# Patient Record
Sex: FEMALE | Race: WHITE | Employment: FULL TIME
[De-identification: names, ages, dates, MRNs, and addresses within clinical notes are randomized per-mention and may not be internally consistent; named-entity substitution may affect disease eponyms.]

---

## 2017-01-10 ENCOUNTER — TELEPHONE (OUTPATIENT)
Dept: OBGYN | Facility: CLINIC | Age: 27
End: 2017-01-10

## 2017-02-07 DIAGNOSIS — T83.32XA IUD STRINGS LOST: ICD-10-CM

## 2018-08-27 ENCOUNTER — OFFICE VISIT (OUTPATIENT)
Dept: OBGYN CLINIC | Age: 28
End: 2018-08-27
Payer: COMMERCIAL

## 2018-08-27 ENCOUNTER — HOSPITAL ENCOUNTER (OUTPATIENT)
Age: 28
Setting detail: SPECIMEN
Discharge: HOME | End: 2018-08-27
Payer: COMMERCIAL

## 2018-08-27 VITALS
BODY MASS INDEX: 24.16 KG/M2 | SYSTOLIC BLOOD PRESSURE: 100 MMHG | WEIGHT: 145 LBS | HEIGHT: 65 IN | HEART RATE: 74 BPM | DIASTOLIC BLOOD PRESSURE: 70 MMHG

## 2018-08-27 DIAGNOSIS — Z01.419 PAP SMEAR, AS PART OF ROUTINE GYNECOLOGICAL EXAMINATION: Primary | ICD-10-CM

## 2018-08-27 DIAGNOSIS — N76.1 CHRONIC VAGINITIS: ICD-10-CM

## 2018-08-27 DIAGNOSIS — R10.2 PELVIC PAIN: ICD-10-CM

## 2018-08-27 DIAGNOSIS — Z01.419 PAP SMEAR, AS PART OF ROUTINE GYNECOLOGICAL EXAMINATION: ICD-10-CM

## 2018-08-27 LAB
BILIRUBIN URINE: NEGATIVE
COLOR: YELLOW
COMMENT UA: NORMAL
DIRECT EXAM: ABNORMAL
GLUCOSE URINE: NEGATIVE
KETONES, URINE: NEGATIVE
LEUKOCYTE ESTERASE, URINE: NEGATIVE
Lab: ABNORMAL
NITRITE, URINE: NEGATIVE
PH UA: 7 (ref 5–8)
PROTEIN UA: NEGATIVE
SPECIFIC GRAVITY UA: 1 (ref 1–1.03)
SPECIMEN DESCRIPTION: ABNORMAL
STATUS: ABNORMAL
TURBIDITY: CLEAR
URINE HGB: NEGATIVE
UROBILINOGEN, URINE: NORMAL

## 2018-08-27 PROCEDURE — 87480 CANDIDA DNA DIR PROBE: CPT

## 2018-08-27 PROCEDURE — G0145 SCR C/V CYTO,THINLAYER,RESCR: HCPCS

## 2018-08-27 PROCEDURE — 81003 URINALYSIS AUTO W/O SCOPE: CPT

## 2018-08-27 PROCEDURE — 87491 CHLMYD TRACH DNA AMP PROBE: CPT

## 2018-08-27 PROCEDURE — 99395 PREV VISIT EST AGE 18-39: CPT | Performed by: NURSE PRACTITIONER

## 2018-08-27 PROCEDURE — 87624 HPV HI-RISK TYP POOLED RSLT: CPT

## 2018-08-27 PROCEDURE — 87510 GARDNER VAG DNA DIR PROBE: CPT

## 2018-08-27 PROCEDURE — 87660 TRICHOMONAS VAGIN DIR PROBE: CPT

## 2018-08-27 PROCEDURE — 87591 N.GONORRHOEAE DNA AMP PROB: CPT

## 2018-08-27 ASSESSMENT — PATIENT HEALTH QUESTIONNAIRE - PHQ9
SUM OF ALL RESPONSES TO PHQ QUESTIONS 1-9: 0
1. LITTLE INTEREST OR PLEASURE IN DOING THINGS: 0
2. FEELING DOWN, DEPRESSED OR HOPELESS: 0
SUM OF ALL RESPONSES TO PHQ9 QUESTIONS 1 & 2: 0
SUM OF ALL RESPONSES TO PHQ QUESTIONS 1-9: 0

## 2018-08-27 NOTE — PROGRESS NOTES
History and Physical  [address], [address] [facility] [address], [address] [facility] [phone]   Fax [phone]  Mk Parrish  2018              33 y.o. Chief Complaint   Patient presents with    Annual Exam       Patient's last menstrual period was 2018 (exact date). Primary Care Physician: No primary care provider on file. The patient was seen and examined. She is here for her annual exam. Currently has IUD in place- 2 years ago (2016). Complaining of pelvic pain started 2-3 weeks ago. Complaining of sharp stabbing pain for the first couple of weeks. Pain is not severe now like it was a couple of weeks ago, but is a generalized ache and improving. Currently sexually active with partner of 8 years. Complaining of vaginal discharge on and off for the past couple of months. Denies odor. Her bowels are regular. There are no voiding complaints. She denies any bloating. She denies vaginal discharge and was counseled on STD's and the need for barrier contraception. HPI : Mk Parrish is a 33 y.o. female     Annual exam  Pelvic pain  vaginitis  ________________________________________________________________________  Obstetric History       T1      L1     SAB0   TAB0   Ectopic0   Molar0   Multiple0   Live Births1       # Outcome Date GA Lbr Sal/2nd Weight Sex Delivery Anes PTL Lv   1 Term 11 40w0d  7 lb 4 oz (3.289 kg) M Vag-Spont   MARKELL      Name: Geradine Jesse:  9                Apgar5: 9        Past Medical History:   Diagnosis Date    Asthma     Attention deficit disorder (ADD)     Depression     LSIL (low grade squamous intraepithelial lesion) on Pap smear 12    LSIL (low grade squamous intraepithelial lesion) on Pap smear                                                                    History reviewed. No pertinent surgical history.   Family History   Problem Relation Age of Onset    Diabetes Father     Cirrhosis Father     Heart Disease Mother         tachycardia    Depression Mother     Anemia Mother     Other Mother         bulemia     Social History     Social History    Marital status: Single     Spouse name: N/A    Number of children: N/A    Years of education: N/A     Occupational History    Not on file. Social History Main Topics    Smoking status: Never Smoker    Smokeless tobacco: Never Used    Alcohol use No    Drug use: No    Sexual activity: Yes     Partners: Male     Other Topics Concern    Not on file     Social History Narrative    No narrative on file       MEDICATIONS:  Current Outpatient Prescriptions   Medication Sig Dispense Refill    levonorgestrel (MIRENA) 20 MCG/24HR IUD 1 each by Intrauterine route once. No current facility-administered medications for this visit. ALLERGIES:  Allergies as of 08/27/2018 - Review Complete 08/27/2018   Allergen Reaction Noted    Neosporin [bacitracin-neomycin-polymyxin] Rash 08/22/2011       Symptoms of decreased mood absent  Symptoms of anhedonia absent    **If either question is answered in a  positive fashion then complete the PHQ9 Scoring Evaluation and make the appropriate referral**      Immunization status: stated as current, but no records available. Gynecologic History:  Menarche: 16 yo  Menopause at [address] [facility] yo     Patient's last menstrual period was 08/20/2018 (exact date). Sexually Active: Yes    STD History: yes, HPV on pap smear    Permanent Sterilization: No   Reversible Birth Control: Yes Mirena in place 12/2016    Hormone Replacement Exposure: No      Genetic Qualified Family History of Breast, Ovarian , Colon or Uterine Cancer: Yes (patient's maternal grandmother with breast cancer- unsure of what age). Thinks her sister had ovarian cancer but unsure. Discussed genetic testing.  Will check with her sister to see if she had ovarian cancer and if she had genetic testing completed. If YES see scanned worksheet. Preventative Health Testing:    Health Maintenance:  Health Maintenance Due   Topic Date Due    HIV screen  06/07/2006    DTaP/Tdap/Td vaccine (1 - Tdap) 06/07/2010    Pneumococcal med risk (1 of 1 - PPSV23) 06/07/2010    Cervical cancer screen  10/27/2017       Date of Last Pap Smear: 10/2016 neg  Abnormal Pap Smear History: yes  Colposcopy History:   Date of Last Mammogram: NA  Date of Last Colonoscopy:   Date of Last Bone Density:      ________________________________________________________________________        REVIEW OF SYSTEMS:    yes   A minimum of an eleven point review of systems was completed. Review Of Systems (11 point):  Constitutional: No fever, chills or malaise; No weight change or fatigue  Head and Eyes: No vision, Headache, Dizziness or trauma in last 12 months  ENT ROS: No hearing, Tinnitis, sinus or taste problems  Hematological and Lymphatic ROS:No Lymphoma, Von Willebrand's, Hemophillia or Bleeding History  Psych ROS: No Depression, Homicidal thoughts,suicidal thoughts, or anxiety  Breast ROS: No prior breast abnormalities or lumps  Respiratory ROS: No SOB, Pneumoniae,Cough, or Pulmonary Embolism History  Cardiovascular ROS: No Chest Pain with Exertion, Palpitations, Syncope, Edema, Arrhythmia  Gastrointestinal ROS: No Indigestion, Heartburn, Nausea, vomiting, Diarrhea, Constipation,or Bowel Changes; No Bloody Stools or melena  Genito-Urinary ROS: No Dysuria, Hematuria or Nocturia.  No Urinary Incontinence +Vaginal Discharge, pelvic pain  Musculoskeletal ROS: No Arthralgia, Arthritis,Gout,Osteoporosis or Rheumatism  Neurological ROS: No CVA, Migraines, Epilepsy, Seizure Hx, or Limb Weakness  Dermatological ROS: No Rash, Itching, Hives, Mole Changes or Cancer PHYSICAL Exam:     Constitutional:  Vitals:    08/27/18 0827   BP: 100/70   Site: Right Arm   Position: Sitting   Cuff Size: Medium Adult   Pulse: 74   Weight: 145 lb (65.8 kg)   Height: 5' 5\" (1.651 m)         General Appearance: This  is a well Developed, well Nourished, well groomed female. Her BMI was reviewed. Nutritional decision making was discussed. Skin:  There was a Normal Inspection of the skin without rashes or lesions. There were no rashes. (Papular, Maculopapular, Hives, Pustular, Macular)     There were no lesions (Ulcers, Erythema, Abn. Appearing Nevi)            Lymphatic:  No Lymph Nodes were Palpable in the neck , axilla or groin.  0 # Of Lymph Nodes; Location ; Character [Normal]  [Shotty] [Tender] [Enlarged]     Neck and EENT:  The neck was supple. There were no masses   The thyroid was not enlarged and had no masses. Perrla, EOMI B/L, TMI B/L No Abnormalities. Throat inspected-No exudates or Masses, Nares Patent No Masses        Respiratory: The lungs were auscultated and found to be clear. There were no rales, rhonchi or wheezes. There was a good respiratory effort. Cardiovascular: The heart was in a regular rate and rhythm. . No S3 or S4. There was no murmur appreciated. Location, grade, and radiation are not applicable. Extremities: The patients extremities were without calf tenderness, edema, or varicosities. There was full range of motion in all four extremities. Pulses in all four extremities were appreciated and are 2/4. Abdomen: The abdomen was soft and non-tender. There were good bowel sounds in all quadrants and there was no guarding, rebound or rigidity. On evaluation there was no evidence of hepatosplenomegaly and there was no costal vertebral dean tenderness bilaterally. No hernias were appreciated. Abdominal Scars: intact    Psych:   The patient had a normal Orientation to: Time, Place, Person, and Situation  There is no Mood / Affect changes    Breast:  (Chest)  normal appearance, no masses or tenderness  Self breast exams were reviewed in detail. Literature was given. Pelvic Exam:  Vulva and vagina appear normal. Bimanual exam reveals normal uterus and adnexa. IUD strings visualized at cervix. Rectal Exam:  exam declined by patient          Musculosk:  Normal Gait and station was noted. Digits were evaluated without abnormal findings. Range of motion, stability and strength were evaluated and found to be appropriate for the patients age. ASSESSMENT:      33 y.o. Annual   Diagnosis Orders   1. Pap smear, as part of routine gynecological examination  PAP SMEAR   2. Pelvic pain  Urinalysis Reflex to Culture    VAGINITIS DNA PROBE    C.trachomatis N.gonorrhoeae DNA    US Pelvis Complete    US Non OB Transvaginal   3. Chronic vaginitis  VAGINITIS DNA PROBE    C.trachomatis N.gonorrhoeae DNA          Chief Complaint   Patient presents with    Annual Exam          Past Medical History:   Diagnosis Date    Asthma     Attention deficit disorder (ADD)     Depression     LSIL (low grade squamous intraepithelial lesion) on Pap smear 9/12/12    LSIL (low grade squamous intraepithelial lesion) on Pap smear          Patient Active Problem List    Diagnosis Date Noted    LSIL (low grade squamous intraepithelial lesion) on Pap smear     Asthma     Depression     Attention deficit disorder (ADD)           Hereditary Breast, Ovarian, Colon and Uterine Cancer screening Done. Tobacco & Secondary smoke risks reviewed; instructed on cessation and avoidance      Counseling Completed:  Preventative Health Recommendations and Follow up. The patient was informed of the recommended preventative health recommendations. 1. Annuals every year; Cytology collections per prevailing guidelines. 2. Mammograms begin every year at 35 yo if no abnormalities are found and no family     History.   3. Bone density studies every 2-3 years. Begin at 73 yo. If no fracture history or osteoporosis family history. (significant). 4. Colonoscopy begin at 40 yo. Repeat every ten years if negative and no family history. 5. Calcium of 4488-4313 mg/day in split dosing  6. Vitamin D 400-800 IU/day  7. All other preventative health recommendations will be managed by the patients Primary care physician. PLAN:  Return in about 1 year (around 8/27/2019) for annual.   Pap smear and vaginal cultures collected. Pelvic ultrasound ordered. If pelvic pain continues, instructed to follow up with physician. UA C&S obtained in office. Repeat Annual every 1 year  Cervical Cytology Evaluation begins at 24years old. If Negative Cytology, Follow-up screening per current guidelines. Mammograms every 1 year. If 37 yo and last mammogram was negative. Calcium and Vitamin D dosing reviewed. Colonoscopy screening reviewed as well as onset for bone density testing. Birth control and barrier recommendations discussed. STD counseling and prevention reviewed. Gardisil counseling completed for all patients 7-35 yo. Routine health maintenance per patients PCP. Orders Placed This Encounter   Procedures    VAGINITIS DNA PROBE     Standing Status:   Future     Standing Expiration Date:   8/27/2019    C.trachomatis N.gonorrhoeae DNA     Standing Status:   Future     Standing Expiration Date:   8/27/2019    US Pelvis Complete     Standing Status:   Future     Standing Expiration Date:   11/27/2018     Order Specific Question:   Reason for exam:     Answer:   pelvic pain    US Non OB Transvaginal     Standing Status:   Future     Standing Expiration Date:   2/27/2019     Order Specific Question:   Reason for exam:     Answer:   pelvic pain    PAP SMEAR     Patient History:    No LMP recorded. OBGYN Status: Having periods  No past surgical history on file.   Medications/Contraceptives Affecting Cytology     Progestin Contraceptives - IUD Disp Start End    levonorgestrel (MIRENA) 20 MCG/24HR IUD       Class: Historical Med       Smoking status: Never Smoker                                                              Smokeless tobacco: Never Used                           Standing Status:   Future     Standing Expiration Date:   8/27/2019     Order Specific Question:   Collection Type     Answer: Thin Prep     Order Specific Question:   Prior Abnormal Pap Test     Answer:   No     Order Specific Question:   Screening or Diagnostic     Answer:   Screening     Order Specific Question:   HPV Requested? Answer:   Yes -  If ASCUS Reflex HPV     Comments:   Reflex to ASCUS, LSIL     Order Specific Question:   High Risk Patient     Answer:   N/A    Urinalysis Reflex to Culture     Standing Status:   Future     Standing Expiration Date:   8/27/2019     Order Specific Question:   SPECIFY(EX-CATH,MIDSTREAM,CYSTO,ETC)?      Answer:   midstream

## 2018-08-28 ENCOUNTER — TELEPHONE (OUTPATIENT)
Dept: OBGYN CLINIC | Age: 28
End: 2018-08-28

## 2018-08-28 LAB
C TRACH DNA GENITAL QL NAA+PROBE: NEGATIVE
N. GONORRHOEAE DNA: NEGATIVE

## 2018-08-28 RX ORDER — METRONIDAZOLE 500 MG/1
500 TABLET ORAL 2 TIMES DAILY
Qty: 14 TABLET | Refills: 0 | Status: SHIPPED | OUTPATIENT
Start: 2018-08-28 | End: 2019-11-13 | Stop reason: SDUPTHER

## 2018-08-28 NOTE — TELEPHONE ENCOUNTER
----- Message from SHONDA Castillo - CNP sent at 8/27/2018  5:38 PM EDT -----  +BV  Flagyl 500mg PO BID X 7 days  UA WNL

## 2018-08-28 NOTE — TELEPHONE ENCOUNTER
Per David Collado pt notified of result of culture.   Rx sent to [facility]

## 2018-09-18 LAB
HPV SAMPLE: ABNORMAL
HPV SOURCE: ABNORMAL
HPV, GENOTYPE 16: DETECTED
HPV, GENOTYPE 18: NOT DETECTED
HPV, HIGH RISK OTHER: NOT DETECTED
HPV, INTERPRETATION: ABNORMAL

## 2018-09-19 ENCOUNTER — TELEPHONE (OUTPATIENT)
Dept: OBGYN CLINIC | Age: 28
End: 2018-09-19

## 2018-09-19 LAB — CYTOLOGY REPORT: NORMAL

## 2018-09-19 NOTE — TELEPHONE ENCOUNTER
Per Lawrence Driscoll patient notified of need for colposcopy and will be scheduled per Cristy Oquendo

## 2019-01-11 ENCOUNTER — HOSPITAL ENCOUNTER (OUTPATIENT)
Age: 29
Setting detail: SPECIMEN
Discharge: HOME | End: 2019-01-11
Payer: COMMERCIAL

## 2019-01-11 ENCOUNTER — PROCEDURE VISIT (OUTPATIENT)
Dept: OBGYN CLINIC | Age: 29
End: 2019-01-11
Payer: COMMERCIAL

## 2019-01-11 VITALS
BODY MASS INDEX: 24.66 KG/M2 | HEART RATE: 68 BPM | HEIGHT: 65 IN | DIASTOLIC BLOOD PRESSURE: 80 MMHG | SYSTOLIC BLOOD PRESSURE: 120 MMHG | WEIGHT: 148 LBS

## 2019-01-11 DIAGNOSIS — R87.610 ASCUS WITH POSITIVE HIGH RISK HPV CERVICAL: Primary | ICD-10-CM

## 2019-01-11 DIAGNOSIS — R87.810 ASCUS WITH POSITIVE HIGH RISK HPV CERVICAL: Primary | ICD-10-CM

## 2019-01-11 DIAGNOSIS — R87.612 LGSIL OF CERVIX OF UNDETERMINED SIGNIFICANCE: ICD-10-CM

## 2019-01-11 DIAGNOSIS — Z32.02 NEGATIVE PREGNANCY TEST: ICD-10-CM

## 2019-01-11 LAB
CONTROL: NORMAL
PREGNANCY TEST URINE, POC: NEGATIVE

## 2019-01-11 PROCEDURE — 57456 ENDOCERV CURETTAGE W/SCOPE: CPT | Performed by: OBSTETRICS & GYNECOLOGY

## 2019-01-11 PROCEDURE — 88305 TISSUE EXAM BY PATHOLOGIST: CPT

## 2019-01-11 PROCEDURE — 81025 URINE PREGNANCY TEST: CPT | Performed by: OBSTETRICS & GYNECOLOGY

## 2019-01-15 LAB — SURGICAL PATHOLOGY REPORT: NORMAL

## 2019-11-08 ENCOUNTER — OFFICE VISIT (OUTPATIENT)
Dept: DERMATOLOGY | Age: 29
End: 2019-11-08
Payer: COMMERCIAL

## 2019-11-08 VITALS
WEIGHT: 151.4 LBS | BODY MASS INDEX: 25.22 KG/M2 | DIASTOLIC BLOOD PRESSURE: 65 MMHG | SYSTOLIC BLOOD PRESSURE: 101 MMHG | HEART RATE: 74 BPM | OXYGEN SATURATION: 98 % | HEIGHT: 65 IN

## 2019-11-08 DIAGNOSIS — Z79.899 ON ISOTRETINOIN THERAPY: Primary | ICD-10-CM

## 2019-11-08 DIAGNOSIS — L70.0 ACNE VULGARIS: ICD-10-CM

## 2019-11-08 PROCEDURE — 99202 OFFICE O/P NEW SF 15 MIN: CPT | Performed by: DERMATOLOGY

## 2019-11-08 RX ORDER — SPIRONOLACTONE 50 MG/1
TABLET, FILM COATED ORAL
Qty: 30 TABLET | Refills: 2 | Status: SHIPPED | OUTPATIENT
Start: 2019-11-08 | End: 2020-01-10

## 2019-11-13 ENCOUNTER — TELEPHONE (OUTPATIENT)
Dept: OBGYN CLINIC | Age: 29
End: 2019-11-13

## 2019-11-13 RX ORDER — METRONIDAZOLE 500 MG/1
500 TABLET ORAL 2 TIMES DAILY
Qty: 14 TABLET | Refills: 0 | Status: SHIPPED | OUTPATIENT
Start: 2019-11-13 | End: 2019-11-20

## 2019-12-02 ENCOUNTER — HOSPITAL ENCOUNTER (OUTPATIENT)
Age: 29
Setting detail: SPECIMEN
Discharge: HOME | End: 2019-12-02
Payer: COMMERCIAL

## 2019-12-02 DIAGNOSIS — Z79.899 ON ISOTRETINOIN THERAPY: ICD-10-CM

## 2019-12-02 LAB — HCG(URINE) PREGNANCY TEST: NEGATIVE

## 2019-12-03 ENCOUNTER — TELEPHONE (OUTPATIENT)
Dept: DERMATOLOGY | Age: 29
End: 2019-12-03

## 2019-12-03 DIAGNOSIS — L70.0 ACNE VULGARIS: Primary | ICD-10-CM

## 2019-12-04 RX ORDER — ISOTRETINOIN 30 MG/1
30 CAPSULE ORAL DAILY
Qty: 30 CAPSULE | Refills: 0 | Status: SHIPPED | OUTPATIENT
Start: 2019-12-04 | End: 2020-01-03

## 2020-01-09 ENCOUNTER — TELEPHONE (OUTPATIENT)
Dept: DERMATOLOGY | Age: 30
End: 2020-01-09

## 2020-01-09 NOTE — TELEPHONE ENCOUNTER
Pt informed that no pa needed, per epic from [employer]. Told her to touch base with [employer] for further info-like copay and such

## 2020-01-10 ENCOUNTER — OFFICE VISIT (OUTPATIENT)
Dept: DERMATOLOGY | Age: 30
End: 2020-01-10
Payer: COMMERCIAL

## 2020-01-10 VITALS
SYSTOLIC BLOOD PRESSURE: 113 MMHG | HEIGHT: 64 IN | BODY MASS INDEX: 26.12 KG/M2 | OXYGEN SATURATION: 99 % | HEART RATE: 63 BPM | DIASTOLIC BLOOD PRESSURE: 68 MMHG | WEIGHT: 153 LBS

## 2020-01-10 PROCEDURE — 99213 OFFICE O/P EST LOW 20 MIN: CPT | Performed by: DERMATOLOGY

## 2020-01-10 NOTE — PROGRESS NOTES
Dermatology Patient Note  [facility]  [facility]  [address] [address]  Dept: [phone]  Dept Fax: [phone]      VISITDATE: 1/10/2020   REFERRING PROVIDER: No ref. provider found      Ronit Zhao is a 30 y.o. female  who presents today in the office for:    Follow-up (4 week accutane-start )      HISTORY OF PRESENT ILLNESS:  Patient presents for f/u acne  Interval history: stopped taking spironolactone because she did not see results right away, ready to start accutane. Acne is still severe with papules, nodules, cysts, scarring      CURRENT MEDICATIONS:   Current Outpatient Medications   Medication Sig Dispense Refill    levonorgestrel (MIRENA) 20 MCG/24HR IUD 1 each by Intrauterine route once. No current facility-administered medications for this visit. ALLERGIES:   Allergies   Allergen Reactions    Neosporin [Bacitracin-Neomycin-Polymyxin] Rash       SOCIAL HISTORY:  Social History     Tobacco Use    Smoking status: Never Smoker    Smokeless tobacco: Never Used   Substance Use Topics    Alcohol use: No       REVIEW OF SYSTEMS:  Review of Systems  Skin: Denies any new changing, growing orbleeding lesions or rashes except as described in the HPI   Constitutional: Denies fevers, chills, and malaise. PHYSICAL EXAM:   /68 (Site: Right Upper Arm, Position: Sitting, Cuff Size: Medium Adult)   Pulse 63   Ht 5' 4\" (1.626 m)   Wt 153 lb (69.4 kg)   SpO2 99%   BMI 26.26 kg/m²     General Exam:  General Appearance: No acute distress, Well nourished     Neuro: Alert and oriented to person, place and time  Psych: Normal affect   Lymph Node: Not performed    Cutaneous Exam: Performed as documented in clinic note below.   Acne exam, which includes the head/face, neck, chest, and back was performed    Pertinent Physical Exam Findings:  Physical Exam  Acne scarring, papules, nodules, cysts of face    Photo surveillance performed: or headaches not relieved by OTC pain relievers. Pt is to inform all other medical providers that pt is on isotretinoin. RTC 1 month            Patient Instructions   -Complete Lab work  -Follow up in 4 weeks  -Discontinue all other acne medications        Follow-up: No follow-ups on file. This note was created with the assistance of a speech-recognition program.  Although the intention is to generate a document that actually reflects the content of the visit, no guarantees can be provided that every mistake has been identified and corrected byediting.     Electronically signed by Meche Gan MD on 1/10/20 at 12:01 PM

## 2020-01-13 ENCOUNTER — HOSPITAL ENCOUNTER (OUTPATIENT)
Age: 30
Setting detail: SPECIMEN
Discharge: HOME | End: 2020-01-13
Payer: COMMERCIAL

## 2020-01-13 LAB
ALBUMIN SERPL-MCNC: 4.5 G/DL (ref 3.5–5.2)
ALBUMIN/GLOBULIN RATIO: 1.6 (ref 1–2.5)
ALP BLD-CCNC: 51 U/L (ref 35–104)
ALT SERPL-CCNC: 18 U/L (ref 5–33)
AST SERPL-CCNC: 21 U/L
BILIRUB SERPL-MCNC: 0.41 MG/DL (ref 0.3–1.2)
BILIRUBIN DIRECT: 0.1 MG/DL
BILIRUBIN, INDIRECT: 0.31 MG/DL (ref 0–1)
CHOLESTEROL/HDL RATIO: 2.3
CHOLESTEROL: 147 MG/DL
GLOBULIN: NORMAL G/DL (ref 1.5–3.8)
HCG(URINE) PREGNANCY TEST: NEGATIVE
HDLC SERPL-MCNC: 63 MG/DL
LDL CHOLESTEROL: 73 MG/DL (ref 0–130)
TOTAL PROTEIN: 7.4 G/DL (ref 6.4–8.3)
TRIGL SERPL-MCNC: 54 MG/DL
VLDLC SERPL CALC-MCNC: NORMAL MG/DL (ref 1–30)

## 2020-01-14 ENCOUNTER — TELEPHONE (OUTPATIENT)
Dept: DERMATOLOGY | Age: 30
End: 2020-01-14

## 2020-01-14 RX ORDER — ISOTRETINOIN 30 MG/1
30 CAPSULE ORAL DAILY
Qty: 30 CAPSULE | Refills: 0 | Status: SHIPPED | OUTPATIENT
Start: 2020-01-14 | End: 2020-02-13

## 2020-01-14 NOTE — TELEPHONE ENCOUNTER
Confirm Patient Counseling is Complete. The patient's Program Status is Required to Demonstrate Comprehension First Time. The patient must answer her Comprehension questions, and then may have her prescription filled before 11:59 PM Eastern Time on 01/19/2020.      Pt informed of all and understands

## 2020-01-14 NOTE — TELEPHONE ENCOUNTER
Please inform patient that labs were normal and preg test negative. Please confirm in ipledge and ask patient to complete comprehension questions. Rx sent to pharmacy.

## 2020-02-11 ENCOUNTER — OFFICE VISIT (OUTPATIENT)
Dept: DERMATOLOGY | Age: 30
End: 2020-02-11
Payer: COMMERCIAL

## 2020-02-11 VITALS
HEIGHT: 65 IN | WEIGHT: 149 LBS | SYSTOLIC BLOOD PRESSURE: 110 MMHG | OXYGEN SATURATION: 98 % | HEART RATE: 74 BPM | DIASTOLIC BLOOD PRESSURE: 69 MMHG | BODY MASS INDEX: 24.83 KG/M2

## 2020-02-11 PROCEDURE — 99213 OFFICE O/P EST LOW 20 MIN: CPT | Performed by: DERMATOLOGY

## 2020-02-11 NOTE — PATIENT INSTRUCTIONS
1. Obtain labs today preferably at a [facility] lab  2. Once we receive the normal test we will order your medication and inform you  3. Once you have been informed, log into [email or web address] and answer the questions your username is your iPledge number and your password should be emailed to you (if you do not have or lose your password call [phone] to obtain it  4. After answering questions go to pharmacy and  medication  5. You have 5 days from your pregnancy test to answer questions and  isotretinoin or you will be locked out of the system and need another pregnancy test  6. Follow up in 4 weeks  7.  Apply triamcinolone twice daily to active rash/itch on hands

## 2020-02-11 NOTE — PROGRESS NOTES
Dermatology Patient Note  [facility]YSICI[facility]  [facility]  [address] [address]  Dept: [phone]  Dept Fax: [phone]      VISITDATE: 2/11/2020   REFERRING PROVIDER: No ref. provider found      Jorge Luis Tierney is a 30 y.o. female  who presents today in the office for:    Acne (Accutane f/u no side effects other than dryness and she has a rash on her hands)      HISTORY OF PRESENT ILLNESS:  HPI Accutane Female Followup:    Valerie Resendiz is on Isotretinoin seen today for her monthly follow-up evaluation. Current Isotretinoin Dose: 30 mg daily   Months of Completed Treatment: 1  Interim Course: no new acne bumps  Areas of Involvement: face  Symptoms: none    Primary Form of Contraception: IUD  Secondary Form of Contraception:Condoms  Patient confirms that she has used both forms of contraception simultaneously for the past 30 days. Yes    Side Effects from Isotretinoin: very dry, a little achey. Dry eczema rash of dorsal hands  Concerns for Mood Changes, Depression, Suicidal Thoughts: none    Laboratory Evaluation Review: Pregnancy Test Results Not Available for Review at the Time of Visit        CURRENT MEDICATIONS:   Current Outpatient Medications   Medication Sig Dispense Refill    triamcinolone (KENALOG) 0.1 % ointment Apply to rash twice daily (not face, armpit or groin) 80 g 1    ISOtretinoin (ACCUTANE) 30 MG chemo capsule Take 1 capsule by mouth daily 30 capsule 0    levonorgestrel (MIRENA) 20 MCG/24HR IUD 1 each by Intrauterine route once. No current facility-administered medications for this visit.         ALLERGIES:   Allergies   Allergen Reactions    Neosporin [Bacitracin-Neomycin-Polymyxin] Rash       SOCIAL HISTORY:  Social History     Tobacco Use    Smoking status: Never Smoker    Smokeless tobacco: Never Used   Substance Use Topics    Alcohol use: No       REVIEW OF SYSTEMS:  Review of Systems  Skin: Denies any new changing, growing orbleeding lesions or rashes except as described in the HPI   Constitutional: Denies fevers, chills, and malaise. PHYSICAL EXAM:   /69 (Site: Left Upper Arm, Position: Sitting, Cuff Size: Medium Adult)   Pulse 74   Ht 5' 5\" (1.651 m)   Wt 149 lb (67.6 kg)   SpO2 98%   BMI 24.79 kg/m²     General Exam:  General Appearance: No acute distress, Well nourished     Neuro: Alert and oriented to person, place and time  Psych: Normal affect   Lymph Node: Not performed    Cutaneous Exam: Performed as documented in clinic note below. Acne exam, which includes the head/face, neck, chest, and back was performed    Pertinent Physical Exam Findings:  Physical Exam  Acne scarring, papules, nodules, cysts of face    Photo surveillance performed: No    Medical Necessity of Exam Performed:   Distribution of patient concerns    Additional Diagnostic Testing performed during exam: Not performed ,  Not performed    ASSESSMENT:   Diagnosis Orders   1. On isotretinoin therapy  Hepatic Function Panel    Pregnancy, Urine    Lipid Panel   2. Acne vulgaris     3. Hand dermatitis  triamcinolone (KENALOG) 0.1 % ointment       Plan of Action is as Follows:  Assessment    1. On isotretinoin therapy  - Hepatic Function Panel; Future  - Pregnancy, Urine; Future  - Lipid Panel; Future    2. Hand dermatitis  - triamcinolone (KENALOG) 0.1 % ointment; Apply to rash twice daily (not face, armpit or groin)  Dispense: 80 g; Refill: 1    3.  Acne vulgaris  Isotretinoin continution  Isotretinoin Continuation  - Isotretinoin start date: 1/10/20  - Current isotretinoin dose: 30 mg/day  - Cumulative isotretinoin dose: 900 mg  - Goal isotretinoin dosage: 24831 mg  (69 kg * 150 mg/kg)  - 2 forms of contraception include: 1.) IUD and 2.) condoms.    - Patient counseled to not share the medication with anyone nor donate blood while on isotretinoin  - Encouraged liberal use of emollients and sunscreen/sun protection          - Check baseline LFTs and lipids, then after 1 month of therapy, then repeat only if increasing dose or if abnormal  - Check urine pregnancy test today and at monthly follow-up. - increase dose to twice daily if labs normal    RTC 1 month            Patient Instructions   1. Obtain labs today preferably at a [facility] lab  2. Once we receive the normal test we will order your medication and inform you  3. Once you have been informed, log into www. [email or web address] and answer the questions your username is your iPledge number and your password should be emailed to you (if you do not have or lose your password call [phone] to obtain it  4. After answering questions go to pharmacy and  medication  5. You have 5 days from your pregnancy test to answer questions and  isotretinoin or you will be locked out of the system and need another pregnancy test  6. Follow up in 4 weeks  7. Apply triamcinolone twice daily to active rash/itch on hands      Follow-up: No follow-ups on file. This note was created with the assistance of a speech-recognition program.  Although the intention is to generate a document that actually reflects the content of the visit, no guarantees can be provided that every mistake has been identified and corrected byediting.     Electronically signed by Adeline Parra MD on 1/10/20 at 12:01 PM

## 2020-02-18 ENCOUNTER — HOSPITAL ENCOUNTER (OUTPATIENT)
Age: 30
Setting detail: SPECIMEN
Discharge: HOME | End: 2020-02-18
Payer: COMMERCIAL

## 2020-02-18 LAB
ALBUMIN SERPL-MCNC: 4.1 G/DL (ref 3.5–5.2)
ALBUMIN/GLOBULIN RATIO: 1.4 (ref 1–2.5)
ALP BLD-CCNC: 70 U/L (ref 35–104)
ALT SERPL-CCNC: 39 U/L (ref 5–33)
AST SERPL-CCNC: 28 U/L
BILIRUB SERPL-MCNC: 0.34 MG/DL (ref 0.3–1.2)
BILIRUBIN DIRECT: 0.08 MG/DL
BILIRUBIN, INDIRECT: 0.26 MG/DL (ref 0–1)
CHOLESTEROL/HDL RATIO: 2.3
CHOLESTEROL: 131 MG/DL
GLOBULIN: ABNORMAL G/DL (ref 1.5–3.8)
HCG(URINE) PREGNANCY TEST: NEGATIVE
HDLC SERPL-MCNC: 56 MG/DL
LDL CHOLESTEROL: 62 MG/DL (ref 0–130)
TOTAL PROTEIN: 7.1 G/DL (ref 6.4–8.3)
TRIGL SERPL-MCNC: 66 MG/DL
VLDLC SERPL CALC-MCNC: NORMAL MG/DL (ref 1–30)

## 2020-02-19 ENCOUNTER — TELEPHONE (OUTPATIENT)
Dept: DERMATOLOGY | Age: 30
End: 2020-02-19

## 2020-02-19 RX ORDER — ISOTRETINOIN 30 MG/1
30 CAPSULE ORAL 2 TIMES DAILY
Qty: 60 CAPSULE | Refills: 0 | Status: SHIPPED | OUTPATIENT
Start: 2020-02-19 | End: 2020-03-11 | Stop reason: SDUPTHER

## 2020-02-19 NOTE — TELEPHONE ENCOUNTER
Confirm Patient Counseling is Complete. The patient's Program Status is Required to Demonstrate Comprehension. The patient must answer her Comprehension questions, and then may have her prescription filled before 11:59 PM Eastern Time on 02/24/2020.

## 2020-03-10 ENCOUNTER — HOSPITAL ENCOUNTER (OUTPATIENT)
Age: 30
Setting detail: SPECIMEN
Discharge: HOME | End: 2020-03-10
Payer: COMMERCIAL

## 2020-03-10 ENCOUNTER — OFFICE VISIT (OUTPATIENT)
Dept: DERMATOLOGY | Age: 30
End: 2020-03-10
Payer: COMMERCIAL

## 2020-03-10 VITALS
HEART RATE: 81 BPM | OXYGEN SATURATION: 98 % | WEIGHT: 147 LBS | DIASTOLIC BLOOD PRESSURE: 76 MMHG | SYSTOLIC BLOOD PRESSURE: 113 MMHG | HEIGHT: 65 IN | BODY MASS INDEX: 24.49 KG/M2

## 2020-03-10 LAB
ALBUMIN SERPL-MCNC: 4.4 G/DL (ref 3.5–5.2)
ALBUMIN/GLOBULIN RATIO: 1.3 (ref 1–2.5)
ALP BLD-CCNC: 58 U/L (ref 35–104)
ALT SERPL-CCNC: 51 U/L (ref 5–33)
AST SERPL-CCNC: 35 U/L
BILIRUB SERPL-MCNC: 0.49 MG/DL (ref 0.3–1.2)
BILIRUBIN DIRECT: 0.13 MG/DL
BILIRUBIN, INDIRECT: 0.36 MG/DL (ref 0–1)
GLOBULIN: ABNORMAL G/DL (ref 1.5–3.8)
HCG(URINE) PREGNANCY TEST: NEGATIVE
TOTAL PROTEIN: 7.7 G/DL (ref 6.4–8.3)

## 2020-03-10 PROCEDURE — 99213 OFFICE O/P EST LOW 20 MIN: CPT | Performed by: DERMATOLOGY

## 2020-03-10 NOTE — PROGRESS NOTES
Dermatology Patient Note  [facility] PHYSICIANS  [facility] HEALTH DERMATOLOGY  [address] [address]  Dept: [phone]  Dept Fax: [phone]      VISITDATE: 3/10/2020   REFERRING PROVIDER: No ref. provider found      Nitza Parada is a 30 y.o. female  who presents today in the office for:    Follow-up (only 1 new breakout; pt is dry, but controlling with moisturizer and chapstick; denies HA, joint pain, feeling depressed; does admit to some worsening night vision)      HISTORY OF PRESENT ILLNESS:  HPI Accutane Female Followup:    Mayi Walker is on Isotretinoin seen today for her monthly follow-up evaluation. Current Isotretinoin Dose: 30 mg BID  Months of Completed Treatment: 2  Interim Course: one new acne bump on right face, mostly just scarring  Areas of Involvement: face  Symptoms: none    Primary Form of Contraception: IUD  Secondary Form of Contraception:Condoms  Patient confirms that she has used both forms of contraception simultaneously for the past 30 days. Yes    Side Effects from Isotretinoin: dryness. Has had some issues with night vision but not too terrible  Concerns for Mood Changes, Depression, Suicidal Thoughts: none    Laboratory Evaluation Review: Pregnancy Test Results Not Available for Review at the Time of Visit        CURRENT MEDICATIONS:   Current Outpatient Medications   Medication Sig Dispense Refill    ISOtretinoin (ACCUTANE) 30 MG chemo capsule Take 1 capsule by mouth 2 times daily 60 capsule 0    triamcinolone (KENALOG) 0.1 % ointment Apply to rash twice daily (not face, armpit or groin) 80 g 1    levonorgestrel (MIRENA) 20 MCG/24HR IUD 1 each by Intrauterine route once. No current facility-administered medications for this visit.         ALLERGIES:   Allergies   Allergen Reactions    Neosporin [Bacitracin-Neomycin-Polymyxin] Rash       SOCIAL HISTORY:  Social History     Tobacco Use    Smoking status: Never Smoker    Smokeless tobacco: Never Used liberal use of emollients and sunscreen/sun protection          - Check baseline LFTs and lipids, then after 1 month of therapy, then repeat only if increasing dose or if abnormal  - Check urine pregnancy test today and at monthly follow-up. - increase dose to twice daily if labs normal    RTC 1 month            Patient Instructions   - Obtain labs today preferably at a [facility] lab  - Once we receive the normal test we will order your medication and inform you  - Once you have been informed, log into [email or web address] and answer the questions your username is your iPledge number and your password should be emailed to you (if you do not have or lose your password call [phone] to obtain it  - After answering questions go to pharmacy and  medication  - You have 5 days from your pregnancy test to answer questions and  isotretinoin or you will be locked out of the system and need another pregnancy test  - Follow up in 4 weeks  - Use Systane eye drops (over-the-counter) for dry eyes        Follow-up: No follow-ups on file. This note was created with the assistance of a speech-recognition program.  Although the intention is to generate a document that actually reflects the content of the visit, no guarantees can be provided that every mistake has been identified and corrected byediting.     Electronically signed by Meche Gan MD on 1/10/20 at 12:01 PM

## 2020-03-11 RX ORDER — ISOTRETINOIN 30 MG/1
30 CAPSULE ORAL 2 TIMES DAILY
Qty: 60 CAPSULE | Refills: 0 | Status: SHIPPED | OUTPATIENT
Start: 2020-03-11 | End: 2020-03-23 | Stop reason: SDUPTHER

## 2020-03-11 NOTE — TELEPHONE ENCOUNTER
Confirm Patient Counseling is Complete. The patient's Program Status is Required to Demonstrate Comprehension. The patient must answer her Comprehension questions, and then may have her prescription filled before 11:59 PM Eastern Time on 03/16/2020. Pt notified, she will set a calender in her phone to remind herself to get labs in two weeks.

## 2020-03-11 NOTE — TELEPHONE ENCOUNTER
Spoke to patient re: lab results. LFTs are slightly higher than last draw. Patient agreed with recheck in two weeks. Will continue at 30 mg BID for now. Went over signs/symptoms of liver inflammation, she will call if any problems. Preg test negative, please confirm in ipledge. Rx sent. Patient would like reminder call to get labs done in 2 weeks.

## 2020-03-20 ENCOUNTER — TELEPHONE (OUTPATIENT)
Dept: DERMATOLOGY | Age: 30
End: 2020-03-20

## 2020-03-23 ENCOUNTER — HOSPITAL ENCOUNTER (OUTPATIENT)
Age: 30
Setting detail: SPECIMEN
Discharge: HOME | End: 2020-03-23
Payer: COMMERCIAL

## 2020-03-23 ENCOUNTER — TELEPHONE (OUTPATIENT)
Dept: DERMATOLOGY | Age: 30
End: 2020-03-23

## 2020-03-23 LAB
ALBUMIN SERPL-MCNC: 4.4 G/DL (ref 3.5–5.2)
ALBUMIN/GLOBULIN RATIO: 1.3 (ref 1–2.5)
ALP BLD-CCNC: 61 U/L (ref 35–104)
ALT SERPL-CCNC: 28 U/L (ref 5–33)
AST SERPL-CCNC: 24 U/L
BILIRUB SERPL-MCNC: 0.34 MG/DL (ref 0.3–1.2)
BILIRUBIN DIRECT: 0.1 MG/DL
BILIRUBIN, INDIRECT: 0.24 MG/DL (ref 0–1)
GLOBULIN: NORMAL G/DL (ref 1.5–3.8)
HCG(URINE) PREGNANCY TEST: NEGATIVE
TOTAL PROTEIN: 7.7 G/DL (ref 6.4–8.3)

## 2020-03-23 RX ORDER — ISOTRETINOIN 30 MG/1
30 CAPSULE ORAL 2 TIMES DAILY
Qty: 60 CAPSULE | Refills: 0 | Status: SHIPPED | OUTPATIENT
Start: 2020-03-23 | End: 2020-04-10 | Stop reason: SDUPTHER

## 2020-03-24 NOTE — TELEPHONE ENCOUNTER
Pt advised and stated understanding. She will download mychart on her phone and then call us to schedule evisit.

## 2020-04-08 ENCOUNTER — TELEMEDICINE (OUTPATIENT)
Dept: DERMATOLOGY | Age: 30
End: 2020-04-08
Payer: COMMERCIAL

## 2020-04-08 PROCEDURE — 99213 OFFICE O/P EST LOW 20 MIN: CPT | Performed by: DERMATOLOGY

## 2020-04-09 ENCOUNTER — HOSPITAL ENCOUNTER (OUTPATIENT)
Age: 30
Setting detail: SPECIMEN
Discharge: HOME | End: 2020-04-09
Payer: COMMERCIAL

## 2020-04-09 LAB
ALBUMIN SERPL-MCNC: 4.4 G/DL (ref 3.5–5.2)
ALBUMIN/GLOBULIN RATIO: 1.5 (ref 1–2.5)
ALP BLD-CCNC: 56 U/L (ref 35–104)
ALT SERPL-CCNC: 54 U/L (ref 5–33)
ANION GAP SERPL CALCULATED.3IONS-SCNC: 12 MMOL/L (ref 9–17)
AST SERPL-CCNC: 43 U/L
BILIRUB SERPL-MCNC: 0.41 MG/DL (ref 0.3–1.2)
BUN BLDV-MCNC: 8 MG/DL (ref 6–20)
BUN/CREAT BLD: ABNORMAL (ref 9–20)
CALCIUM SERPL-MCNC: 9.2 MG/DL (ref 8.6–10.4)
CHLORIDE BLD-SCNC: 100 MMOL/L (ref 98–107)
CO2: 23 MMOL/L (ref 20–31)
CREAT SERPL-MCNC: 0.52 MG/DL (ref 0.5–0.9)
GFR AFRICAN AMERICAN: >60 ML/MIN
GFR NON-AFRICAN AMERICAN: >60 ML/MIN
GFR SERPL CREATININE-BSD FRML MDRD: ABNORMAL ML/MIN/{1.73_M2}
GFR SERPL CREATININE-BSD FRML MDRD: ABNORMAL ML/MIN/{1.73_M2}
GLUCOSE BLD-MCNC: 85 MG/DL (ref 70–99)
HCG(URINE) PREGNANCY TEST: NEGATIVE
POTASSIUM SERPL-SCNC: 4.3 MMOL/L (ref 3.7–5.3)
SODIUM BLD-SCNC: 135 MMOL/L (ref 135–144)
TOTAL PROTEIN: 7.4 G/DL (ref 6.4–8.3)

## 2020-04-10 ENCOUNTER — TELEPHONE (OUTPATIENT)
Dept: DERMATOLOGY | Age: 30
End: 2020-04-10

## 2020-04-10 RX ORDER — ISOTRETINOIN 30 MG/1
30 CAPSULE ORAL 2 TIMES DAILY
Qty: 60 CAPSULE | Refills: 0 | Status: SHIPPED | OUTPATIENT
Start: 2020-04-10 | End: 2020-05-07 | Stop reason: SDUPTHER

## 2020-04-10 NOTE — TELEPHONE ENCOUNTER
Confirm Patient Counseling is Complete. The patient's Program Status is Required to Demonstrate Comprehension. The patient must answer her Comprehension questions, and then may have her prescription filled before 11:59 PM Eastern Time on 04/15/2020.

## 2020-04-15 ENCOUNTER — TELEPHONE (OUTPATIENT)
Dept: DERMATOLOGY | Age: 30
End: 2020-04-15

## 2020-05-07 RX ORDER — ISOTRETINOIN 30 MG/1
30 CAPSULE ORAL 2 TIMES DAILY
Qty: 60 CAPSULE | Refills: 0 | Status: SHIPPED | OUTPATIENT
Start: 2020-05-07 | End: 2020-06-06

## 2020-05-07 NOTE — TELEPHONE ENCOUNTER
Confirm Patient Counseling is Complete. The patient's Program Status is Required to Demonstrate Comprehension. The patient must answer her Comprehension questions, and then may have her prescription filled before 11:59 PM Eastern Time on 05/12/2020.

## 2020-05-07 NOTE — TELEPHONE ENCOUNTER
I received negative pregnancy test yesterday. Her last visit was too early and she still had enough med left to take her to now (though I suspect she has missed a few days of med). Isotretinoin Continuation  - Isotretinoin start date: 1/10/20  - Current isotretinoin dose: 60 mg/day  - Cumulative isotretinoin dose: 4500 mg  - Goal isotretinoin dosage: 75749 mg  (69kg * 150 mg/kg)  - 2 forms of contraception include: 1.) IUD and 2.) condoms.    - Patient counseled to not share the medication with anyone nor donate blood while on isotretinoin  - Encouraged liberal use of emollients and sunscreen/sun protection            Please inform in ipledge and ask patient to complete her comprehesion questions.  Rx sent to pharmacy

## 2020-05-07 NOTE — TELEPHONE ENCOUNTER
Mariela Soliman is requesting a refill on the following medications:   Requested Prescriptions     Pending Prescriptions Disp Refills    ISOtretinoin (ACCUTANE) 30 MG chemo capsule 60 capsule 0     Sig: Take 1 capsule by mouth 2 times daily       Last OV 4/15  Pt only has one pill left    Future Appointments   Date Time Provider Jose Luis Russo   5/11/2020  9:00 AM [facility] Caldwell [facility]

## 2020-05-11 ENCOUNTER — TELEMEDICINE (OUTPATIENT)
Dept: DERMATOLOGY | Age: 30
End: 2020-05-11
Payer: COMMERCIAL

## 2020-05-11 PROCEDURE — 99213 OFFICE O/P EST LOW 20 MIN: CPT | Performed by: DERMATOLOGY

## 2020-05-11 NOTE — PROGRESS NOTES
TELEHEALTH EVALUATION -- Audio/Visual (During XIWXV-03 public health emergency)    Dermatology Patient Note  [address]  [address] [facility] [address]  Dept: [phone]  Dept Fax: [phone]      VISIT DATE: 5/11/2020   REFERRING PROVIDER: No ref. provider found      Sully Chin is a 30 y.o. female  who presents today in the office for:    No chief complaint on file. HISTORY OF PRESENT ILLNESS:  HPI Accutane Female Followup:    Maryam Clement is on Isotretinoin seen today for her monthly follow-up evaluation. Current Isotretinoin Dose: 60 mg/day   Months of Completed Treatment: 3 months (over 4 mo period d/t lapses in treatment)  Interim Course: acne is still flaring  Areas of Involvement: face  Symptoms: acne papules and scars    Primary Form of Contraception: IUD  Secondary Form of Contraception:Condoms  Patient confirms that she has used both forms of contraception simultaneously for the past 30 days. Yes    Side Effects from Isotretinoin: dryness  Concerns for Mood Changes, Depression, Suicidal Thoughts: none    Laboratory Evaluation Review: Pregnancy Test Negative and Lab Studies within Normal Limits      CURRENT MEDICATIONS:   Current Outpatient Medications   Medication Sig Dispense Refill    ISOtretinoin (ACCUTANE) 30 MG chemo capsule Take 1 capsule by mouth 2 times daily 60 capsule 0    triamcinolone (KENALOG) 0.1 % ointment Apply to rash twice daily (not face, armpit or groin) 80 g 1    levonorgestrel (MIRENA) 20 MCG/24HR IUD 1 each by Intrauterine route once. No current facility-administered medications for this visit.         ALLERGIES:   Allergies   Allergen Reactions    Neosporin [Bacitracin-Neomycin-Polymyxin] Rash       SOCIAL HISTORY:  Social History     Tobacco Use    Smoking status: Never Smoker    Smokeless tobacco: Never Used   Substance Use Topics    Alcohol use: No       REVIEW OF SYSTEMS:  Review of tomorrow    2. Eczematous dermatitis of arms and hands  - triamcinolone too greasy and effects not longlasting  - fluocinonide (LIDEX) 0.05 % cream; Apply topically 2 times daily to rash on arms  Dispense: 30 g; Refill: 2    RTC 1 month          There are no Patient Instructions on file for this visit. Photo surveillance performed: No    Follow-up: No follow-ups on file. Pursuant to the emergency declaration under the [address] [place] [address], [facility] waiver authority and the [employer] Act, this Virtual  Visit was conducted, with patient's consent, to reduce the patient's risk of exposure to COVID-19 and provide continuity of care for an established patient. Services were provided through a video synchronous discussion virtually to substitute for in-person clinic visit.      Electronically signed by Bob Granados MD on 5/11/20 at 9:11 AM EDT

## 2020-06-04 ENCOUNTER — TELEMEDICINE (OUTPATIENT)
Dept: DERMATOLOGY | Age: 30
End: 2020-06-04
Payer: COMMERCIAL

## 2020-06-04 PROCEDURE — 99213 OFFICE O/P EST LOW 20 MIN: CPT | Performed by: DERMATOLOGY

## 2020-06-04 NOTE — PROGRESS NOTES
TELEHEALTH EVALUATION -- Audio/Visual (During DPRLA-91 public health emergency)    Dermatology Patient Note  [address]  [place] [address]  Dept: [phone]  Dept Fax: [phone]      VISIT DATE: 6/4/2020   REFERRING PROVIDER: No ref. provider found      Alec Coronado is a 30 y.o. female  who presents today in the office for:    Acne      HISTORY OF PRESENT ILLNESS:  HPI Accutane Female Followup:    Jose E Doshi is on Isotretinoin seen today for her monthly follow-up evaluation. Current Isotretinoin Dose: 60 mg/day   Months of Completed Treatment: 4 months (over 5 mo period d/t lapses in treatment)  Interim Course: states that acne is finally improving  Areas of Involvement: face  Symptoms: acne papules and scars    Primary Form of Contraception: IUD  Secondary Form of Contraception:Condoms  Patient confirms that she has used both forms of contraception simultaneously for the past 30 days. Yes    Side Effects from Isotretinoin: dryness and nasal pain due to dryness  Concerns for Mood Changes, Depression, Suicidal Thoughts: none    Laboratory Evaluation Review: Pregnancy Test Negative and Lab Studies within Normal Limits      CURRENT MEDICATIONS:   Current Outpatient Medications   Medication Sig Dispense Refill    fluocinonide (LIDEX) 0.05 % cream Apply topically 2 times daily to rash on arms 30 g 2    ISOtretinoin (ACCUTANE) 30 MG chemo capsule Take 1 capsule by mouth 2 times daily 60 capsule 0    triamcinolone (KENALOG) 0.1 % ointment Apply to rash twice daily (not face, armpit or groin) 80 g 1    levonorgestrel (MIRENA) 20 MCG/24HR IUD 1 each by Intrauterine route once. No current facility-administered medications for this visit.         ALLERGIES:   Allergies   Allergen Reactions    Neosporin [Bacitracin-Neomycin-Polymyxin] Rash       SOCIAL HISTORY:  Social History     Tobacco Use    Smoking status: Never Smoker    Smokeless

## 2020-07-07 ENCOUNTER — OFFICE VISIT (OUTPATIENT)
Dept: DERMATOLOGY | Age: 30
End: 2020-07-07
Payer: COMMERCIAL

## 2020-07-07 VITALS
HEIGHT: 65 IN | DIASTOLIC BLOOD PRESSURE: 77 MMHG | TEMPERATURE: 97.3 F | SYSTOLIC BLOOD PRESSURE: 111 MMHG | OXYGEN SATURATION: 99 % | HEART RATE: 62 BPM | BODY MASS INDEX: 24.99 KG/M2 | WEIGHT: 150 LBS

## 2020-07-07 PROCEDURE — 99213 OFFICE O/P EST LOW 20 MIN: CPT | Performed by: DERMATOLOGY

## 2020-07-07 NOTE — PROGRESS NOTES
each by Intrauterine route once. No current facility-administered medications for this visit. ALLERGIES:   Allergies   Allergen Reactions    Neosporin [Bacitracin-Neomycin-Polymyxin] Rash       SOCIAL HISTORY:  Social History     Tobacco Use    Smoking status: Never Smoker    Smokeless tobacco: Never Used   Substance Use Topics    Alcohol use: No       REVIEW OF SYSTEMS:  Review of Systems  Skin: Denies any new changing, growing orbleeding lesions or rashes except as described in the HPI   Constitutional: Denies fevers, chills, and malaise. PHYSICAL EXAM:   /77 (Site: Left Upper Arm, Position: Sitting, Cuff Size: Medium Adult)   Pulse 62   Temp 97.3 °F (36.3 °C)   Ht 5' 5\" (1.651 m)   Wt 150 lb (68 kg)   SpO2 99%   BMI 24.96 kg/m²     General Exam:  General Appearance: No acute distress, Well nourished     Neuro: Alert and oriented to person, place and time  Psych: Normal affect   Lymph Node: Not performed    Cutaneous Exam: Performed as documented in clinic note below. Acne exam, which includes the head/face, neck, chest, and back was performed    Pertinent Physical Exam Findings:  Physical Exam  Acneiform papules, nodules, and scarring of face    Photo surveillance performed: No    Medical Necessity of Exam Performed:   Distribution of patient concerns    Additional Diagnostic Testing performed during exam: Not performed ,  Not performed    ASSESSMENT:   Diagnosis Orders   1. On isotretinoin therapy  Hepatic Function Panel    Lipid Panel    Pregnancy, Urine   2. Acne vulgaris         Plan of Action is as Follows:  Assessment   1. On isotretinoin therapy  - given new pain symptoms will check labs  - Hepatic Function Panel; Future  - Lipid Panel; Future  - Pregnancy, Urine; Future    2. Acne vulgaris  REDUCE dose of isotretinoin to 30 mg daily x 1 week to see if symptoms improve. If so consider staying at lower dose  Discussed her new symptoms of depression.  She states it is well

## 2020-07-17 ENCOUNTER — HOSPITAL ENCOUNTER (OUTPATIENT)
Age: 30
Setting detail: SPECIMEN
Discharge: HOME | End: 2020-07-17

## 2020-07-17 LAB
ALBUMIN SERPL-MCNC: 4.7 G/DL (ref 3.5–5.2)
ALBUMIN/GLOBULIN RATIO: 1.6 (ref 1–2.5)
ALP BLD-CCNC: 63 U/L (ref 35–104)
ALT SERPL-CCNC: 23 U/L (ref 5–33)
AST SERPL-CCNC: 26 U/L
BILIRUB SERPL-MCNC: 0.46 MG/DL (ref 0.3–1.2)
BILIRUBIN DIRECT: 0.1 MG/DL
BILIRUBIN, INDIRECT: 0.36 MG/DL (ref 0–1)
CHOLESTEROL/HDL RATIO: 3.1
CHOLESTEROL: 170 MG/DL
GLOBULIN: NORMAL G/DL (ref 1.5–3.8)
HCG(URINE) PREGNANCY TEST: NEGATIVE
HDLC SERPL-MCNC: 55 MG/DL
LDL CHOLESTEROL: 96 MG/DL (ref 0–130)
TOTAL PROTEIN: 7.6 G/DL (ref 6.4–8.3)
TRIGL SERPL-MCNC: 97 MG/DL
VLDLC SERPL CALC-MCNC: NORMAL MG/DL (ref 1–30)

## 2020-07-20 ENCOUNTER — TELEPHONE (OUTPATIENT)
Dept: OBGYN CLINIC | Age: 30
End: 2020-07-20

## 2020-07-20 ENCOUNTER — TELEPHONE (OUTPATIENT)
Dept: DERMATOLOGY | Age: 30
End: 2020-07-20

## 2020-07-20 RX ORDER — ISOTRETINOIN 30 MG/1
30 CAPSULE ORAL 2 TIMES DAILY
Qty: 60 CAPSULE | Refills: 0 | Status: SHIPPED | OUTPATIENT
Start: 2020-07-20 | End: 2020-08-19

## 2020-07-20 RX ORDER — METRONIDAZOLE 500 MG/1
500 TABLET ORAL 2 TIMES DAILY
Qty: 14 TABLET | Refills: 0 | Status: SHIPPED | OUTPATIENT
Start: 2020-07-20 | End: 2021-09-02 | Stop reason: SDUPTHER

## 2020-07-20 NOTE — TELEPHONE ENCOUNTER
Patient requesting something for BV,     *Annual is scheduled for 7/30/20    **[address] [facility]

## 2020-07-20 NOTE — TELEPHONE ENCOUNTER
Confirm Patient Counseling is Complete. The patient's Program Status is Required to Demonstrate Comprehension. The patient must answer her Comprehension questions, and then may have her prescription filled before 11:59 PM Eastern Time on 07/23/2020.        Pt notified

## 2020-07-30 ENCOUNTER — OFFICE VISIT (OUTPATIENT)
Dept: OBGYN CLINIC | Age: 30
End: 2020-07-30

## 2020-07-30 ENCOUNTER — HOSPITAL ENCOUNTER (OUTPATIENT)
Age: 30
Setting detail: SPECIMEN
Discharge: HOME | End: 2020-07-30

## 2020-07-30 VITALS
TEMPERATURE: 98.9 F | BODY MASS INDEX: 24.66 KG/M2 | SYSTOLIC BLOOD PRESSURE: 122 MMHG | HEART RATE: 80 BPM | DIASTOLIC BLOOD PRESSURE: 64 MMHG | HEIGHT: 65 IN | WEIGHT: 148 LBS

## 2020-07-30 LAB
DIRECT EXAM: NORMAL
Lab: NORMAL
SPECIMEN DESCRIPTION: NORMAL

## 2020-07-30 PROCEDURE — 87660 TRICHOMONAS VAGIN DIR PROBE: CPT

## 2020-07-30 PROCEDURE — 87591 N.GONORRHOEAE DNA AMP PROB: CPT

## 2020-07-30 PROCEDURE — 87491 CHLMYD TRACH DNA AMP PROBE: CPT

## 2020-07-30 PROCEDURE — 87480 CANDIDA DNA DIR PROBE: CPT

## 2020-07-30 PROCEDURE — G0145 SCR C/V CYTO,THINLAYER,RESCR: HCPCS

## 2020-07-30 PROCEDURE — 87510 GARDNER VAG DNA DIR PROBE: CPT

## 2020-07-30 PROCEDURE — 99395 PREV VISIT EST AGE 18-39: CPT | Performed by: NURSE PRACTITIONER

## 2020-07-30 ASSESSMENT — PATIENT HEALTH QUESTIONNAIRE - PHQ9
SUM OF ALL RESPONSES TO PHQ QUESTIONS 1-9: 0
1. LITTLE INTEREST OR PLEASURE IN DOING THINGS: 0
SUM OF ALL RESPONSES TO PHQ9 QUESTIONS 1 & 2: 0
2. FEELING DOWN, DEPRESSED OR HOPELESS: 0
SUM OF ALL RESPONSES TO PHQ QUESTIONS 1-9: 0

## 2020-07-30 NOTE — PROGRESS NOTES
History and Physical  [address], [address] [facility] [address] [address] [phone]   Fax [phone]  Mariah Pinon  2020              35 y.o. Chief Complaint   Patient presents with    Gynecologic Exam     had unprotected sex with someone and just wants to be checked       Patient's last menstrual period was 2020. Primary Care Physician: No primary care provider on file. The patient was seen and examined. She has no chief complaint today and is here for her annual exam.  Her bowels are regular. There are no voiding complaints. She denies any bloating. She denies vaginal discharge and was counseled on STD's and the need for barrier contraception. HPI : Mariah Pinon is a 35 y.o. female     Annual exam   No chief complaints  ________________________________________________________________________  OB History    Para Term  AB Living   1 1 1 0 0 1   SAB TAB Ectopic Molar Multiple Live Births   0 0 0 0 0 1      # Outcome Date GA Lbr Sal/2nd Weight Sex Delivery Anes PTL Lv   1 Term 11 40w0d  7 lb 4 oz (3.289 kg) M Vag-Spont   MARKELL      Name: Demetrice Camacho: 9  Apgar5: 9     Past Medical History:   Diagnosis Date    Asthma     Attention deficit disorder (ADD)     Depression     LSIL (low grade squamous intraepithelial lesion) on Pap smear 12    LSIL (low grade squamous intraepithelial lesion) on Pap smear                                                                    History reviewed. No pertinent surgical history.   Family History   Problem Relation Age of Onset    Diabetes Father     Cirrhosis Father     Heart Disease Mother         tachycardia    Depression Mother     Anemia Mother     Other Mother         bulemia     Social History     Socioeconomic History    Marital status: Single     Spouse name: Not on file    Number of children: Not on file    Years of education: Not on file    Highest education level: Not on file   Occupational History    Not on file   Social Needs    Financial resource strain: Not on file    Food insecurity     Worry: Not on file     Inability: Not on file    Transportation needs     Medical: Not on file     Non-medical: Not on file   Tobacco Use    Smoking status: Never Smoker    Smokeless tobacco: Never Used   Substance and Sexual Activity    Alcohol use: No    Drug use: No    Sexual activity: Yes     Partners: Male   Lifestyle    Physical activity     Days per week: Not on file     Minutes per session: Not on file    Stress: Not on file   Relationships    Social connections     Talks on phone: Not on file     Gets together: Not on file     Attends [employer] service: Not on file     Active member of club or organization: Not on file     Attends meetings of clubs or organizations: Not on file     Relationship status: Not on file    Intimate partner violence     Fear of current or ex partner: Not on file     Emotionally abused: Not on file     Physically abused: Not on file     Forced sexual activity: Not on file   Other Topics Concern    Not on file   Social History Narrative    Not on file       MEDICATIONS:  Current Outpatient Medications   Medication Sig Dispense Refill    ISOtretinoin (ACCUTANE) 30 MG chemo capsule Take 1 capsule by mouth 2 times daily 60 capsule 0    levonorgestrel (MIRENA) 20 MCG/24HR IUD 1 each by Intrauterine route once.  fluocinonide (LIDEX) 0.05 % cream Apply topically 2 times daily to rash on arms (Patient not taking: Reported on 7/30/2020) 30 g 2     No current facility-administered medications for this visit.             ALLERGIES:  Allergies as of 07/30/2020 - Review Complete 07/30/2020   Allergen Reaction Noted    Neosporin [bacitracin-neomycin-polymyxin] Rash 08/22/2011       Symptoms of decreased mood absent  Symptoms of anhedonia absent    **If either question is answered in a positive fashion then complete the PHQ9 Scoring Evaluation and make the appropriate referral**      Immunization status: stated as current, but no records available. Gynecologic History:  Menarche: 16 yo  Menopause at [address] [place] yo     Patient's last menstrual period was 06/01/2020. Sexually Active: Yes    STD History: No     Permanent Sterilization: No   Reversible Birth Control: Yes IUD        Hormone Replacement Exposure: No      Genetic Qualified Family History of Breast, Ovarian , Colon or Uterine Cancer: No     If YES see scanned worksheet. Preventative Health Testing:    Health Maintenance:  Health Maintenance Due   Topic Date Due    Varicella vaccine (1 of 2 - 2-dose childhood series) 06/07/1992    Pneumococcal 0-64 years Vaccine (1 of 1 - PPSV23) 06/07/1997    HIV screen  06/07/2006    DTaP/Tdap/Td vaccine (1 - Tdap) 06/07/2010    Cervical cancer screen  08/27/2019       Date of Last Pap Smear: 08/27/2018 ascus/positive  Abnormal Pap Smear History: ASCUS +HPV  Colposcopy History:   Date of Last Mammogram: NA  Date of Last Colonoscopy:   Date of Last Bone Density:      ________________________________________________________________________        REVIEW OF SYSTEMS:    yes   A minimum of an eleven point review of systems was completed. Review Of Systems (11 point):  Constitutional: No fever, chills or malaise;  No weight change or fatigue  Head and Eyes: No vision, Headache, Dizziness or trauma in last 12 months  ENT ROS: No hearing, Tinnitis, sinus or taste problems  Hematological and Lymphatic ROS:No Lymphoma, Von Willebrand's, Hemophillia or Bleeding History  Psych ROS: No Depression, Homicidal thoughts,suicidal thoughts, or anxiety  Breast ROS: No prior breast abnormalities or lumps  Respiratory ROS: No SOB, Pneumoniae,Cough, or Pulmonary Embolism History  Cardiovascular ROS: No Chest Pain with Exertion, Palpitations, Syncope, Edema, Arrhythmia  Gastrointestinal ROS: No Indigestion, Heartburn, Nausea, vomiting, Diarrhea, Constipation,or Bowel Changes; No Bloody Stools or melena  Genito-Urinary ROS: No Dysuria, Hematuria or Nocturia. No Urinary Incontinence or Vaginal Discharge  Musculoskeletal ROS: No Arthralgia, Arthritis,Gout,Osteoporosis or Rheumatism  Neurological ROS: No CVA, Migraines, Epilepsy, Seizure Hx, or Limb Weakness  Dermatological ROS: No Rash, Itching, Hives, Mole Changes or Cancer                                                                                                                                                                                                                                  PHYSICAL Exam:     Constitutional:  Vitals:    07/30/20 1251   BP: 122/64   Pulse: 80   Temp: 98.9 °F (37.2 °C)   TempSrc: Infrared   Weight: 148 lb (67.1 kg)   Height: 5' 5\" (1.651 m)         General Appearance: This  is a well Developed, well Nourished, well groomed female. Her BMI was reviewed. Nutritional decision making was discussed. Skin:  There was a Normal Inspection of the skin without rashes or lesions. There were no rashes. (Papular, Maculopapular, Hives, Pustular, Macular)     There were no lesions (Ulcers, Erythema, Abn. Appearing Nevi)            Lymphatic:  No Lymph Nodes were Palpable in the neck , axilla or groin.  0 # Of Lymph Nodes; Location ; Character [Normal]  [Shotty] [Tender] [Enlarged]     Neck and EENT:  The neck was supple. There were no masses   The thyroid was not enlarged and had no masses. Perrla, EOMI B/L, TMI B/L No Abnormalities. Throat inspected-No exudates or Masses, Nares Patent No Masses        Respiratory: The lungs were auscultated and found to be clear. There were no rales, rhonchi or wheezes. There was a good respiratory effort. Cardiovascular: The heart was in a regular rate and rhythm. . No S3 or S4. There was no murmur appreciated. Location, grade, and radiation are not applicable. Extremities:   The patients extremities were without calf tenderness, edema, or varicosities. There was full range of motion in all four extremities. Pulses in all four extremities were appreciated and are 2/4. Abdomen: The abdomen was soft and non-tender. There were good bowel sounds in all quadrants and there was no guarding, rebound or rigidity. On evaluation there was no evidence of hepatosplenomegaly and there was no costal vertebral dean tenderness bilaterally. No hernias were appreciated. Abdominal Scars: intact    Psych: The patient had a normal Orientation to: Time, Place, Person, and Situation  There is no Mood / Affect changes    Breast:  (Chest)  normal appearance, no masses or tenderness  Self breast exams were reviewed in detail. Literature was given. Pelvic Exam:  Vulva and vagina appear normal. Bimanual exam reveals normal uterus and adnexa. Rectal Exam:  exam declined by patient          Musculosk:  Normal Gait and station was noted. Digits were evaluated without abnormal findings. Range of motion, stability and strength were evaluated and found to be appropriate for the patients age. ASSESSMENT:      35 y.o. Annual   Diagnosis Orders   1. Women's annual routine gynecological examination  PAP SMEAR   2.  Subacute vaginitis  VAGINITIS DNA PROBE    C.trachomatis N.gonorrhoeae DNA          Chief Complaint   Patient presents with    Gynecologic Exam     had unprotected sex with someone and just wants to be checked          Past Medical History:   Diagnosis Date    Asthma     Attention deficit disorder (ADD)     Depression     LSIL (low grade squamous intraepithelial lesion) on Pap smear 9/12/12    LSIL (low grade squamous intraepithelial lesion) on Pap smear          Patient Active Problem List    Diagnosis Date Noted    LGSIL of cervix of undetermined significance     Asthma     Depression     Attention deficit disorder (ADD)           Hereditary Breast, Ovarian, Colon and Uterine Cancer screening Disp Start End     levonorgestrel (MIRENA) 20 MCG/24HR IUD          Class: Historical Med       Social History    Tobacco Use      Smoking status: Never Smoker      Smokeless tobacco: Never Used       Standing Status:   Future     Standing Expiration Date:   7/30/2021     Order Specific Question:   Collection Type     Answer: Thin Prep     Order Specific Question:   Prior Abnormal Pap Test     Answer:   No     Order Specific Question:   Screening or Diagnostic     Answer:   Screening     Order Specific Question:   HPV Requested?      Answer:   Yes - If Abnormal Reflex HPV     Order Specific Question:   High Risk Patient     Answer:   N/A

## 2020-07-31 LAB
C TRACH DNA GENITAL QL NAA+PROBE: NEGATIVE
N. GONORRHOEAE DNA: NEGATIVE
SPECIMEN DESCRIPTION: NORMAL

## 2020-08-04 LAB — CYTOLOGY REPORT: NORMAL

## 2020-08-05 ENCOUNTER — TELEMEDICINE (OUTPATIENT)
Dept: DERMATOLOGY | Age: 30
End: 2020-08-05

## 2020-08-05 PROCEDURE — 99213 OFFICE O/P EST LOW 20 MIN: CPT | Performed by: DERMATOLOGY

## 2020-08-05 RX ORDER — SPIRONOLACTONE 50 MG/1
TABLET, FILM COATED ORAL
Qty: 30 TABLET | Refills: 2 | Status: SHIPPED | OUTPATIENT
Start: 2020-08-05 | End: 2021-01-13 | Stop reason: SDUPTHER

## 2020-08-05 NOTE — PROGRESS NOTES
TELEHEALTH EVALUATION -- Audio/Visual (During LPFWP-60 public health emergency)    Dermatology Patient Note  [address]  [place] [address]  Dept: [phone]  Dept Fax: [phone]      VISIT DATE: 8/5/2020   REFERRING PROVIDER: No ref. provider found      Abdoul Corona is a 35 y.o. female  who presents today in the office for:    No chief complaint on file. HISTORY OF PRESENT ILLNESS:  HPI Accutane Female Followup:    Vasu Vazquez is on Isotretinoin seen today for her monthly follow-up evaluation. Current Isotretinoin Dose: 60 mg/day   Months of Completed Treatment: 6 months  Interim Course: acne is improving but still getting breakouts, especially around menstrual period  Areas of Involvement: face  Symptoms: acne papules and scars    Primary Form of Contraception: IUD  Secondary Form of Contraception:Condoms  Patient confirms that she has used both forms of contraception simultaneously for the past 30 days. Yes    Side Effects from Isotretinoin: dryness only  Concerns for Mood Changes, Depression, Suicidal Thoughts: none. Patient reports mood significantly improved after seeing a counselor    Laboratory Evaluation Review: Pregnancy Test Negative and Lab Studies within Normal Limits      CURRENT MEDICATIONS:   Current Outpatient Medications   Medication Sig Dispense Refill    spironolactone (ALDACTONE) 50 MG tablet Take one half tablet PO daily x 7 days, then increase to one full tablet PO daily if tolerating 30 tablet 2    ISOtretinoin (ACCUTANE) 30 MG chemo capsule Take 1 capsule by mouth 2 times daily 60 capsule 0    fluocinonide (LIDEX) 0.05 % cream Apply topically 2 times daily to rash on arms (Patient not taking: Reported on 7/30/2020) 30 g 2    levonorgestrel (MIRENA) 20 MCG/24HR IUD 1 each by Intrauterine route once. No current facility-administered medications for this visit.         ALLERGIES:   Allergies   Allergen 1 month of therapy, then repeat only if increasing dose or if abnormal  - Check urine pregnancy test at monthly follow-up. - patient is likely too early for repeat preg test, will wait until ipledge allows it  - given slow response to isotretinoin and menstrual flares, will start spironolactone as well  Discussed spironolactone with patient. The patient was informed of common side effects such as increased urination/urinary frequency, menstrual irregularities with mid-cycle spotting, breast tenderness, decreased libido, fatigue, headache, and dizziness. Patient informed to stop taking medication and to immediately report any new onset muscle cramps or weakness, or palpitations. Patient informed that pregnancy needs to be avoided while on this medication. Discussed risk of elevated potassium and the need to stay away from potassium supplements while on the medication. 2. On isotretinoin therapy  - patient to [facility]    RTC 1 month          There are no Patient Instructions on file for this visit. Photo surveillance performed: No    Follow-up: No follow-ups on file. Pursuant to the emergency declaration under the [facility], [facility] waiver authority and the [employer] and [employer] Act, this Virtual  Visit was conducted, with patient's consent, to reduce the patient's risk of exposure to COVID-19 and provide continuity of care for an established patient. Services were provided through a video synchronous discussion virtually to substitute for in-person clinic visit.      Electronically signed by Pj Brothers MD on 5/11/20 at 9:11 AM MATTHEW

## 2020-08-05 NOTE — Clinical Note
I believe patient is too early for her pregnancy test. Please let her know when she is able to take her next preg test per ipledge, then schedule VV f/u one month after that date.   Remind her to take her isotretinoin with fatty foods to help absorption

## 2020-09-17 ENCOUNTER — TELEMEDICINE (OUTPATIENT)
Dept: DERMATOLOGY | Age: 30
End: 2020-09-17

## 2020-09-17 PROCEDURE — 99213 OFFICE O/P EST LOW 20 MIN: CPT | Performed by: DERMATOLOGY

## 2020-09-17 NOTE — PROGRESS NOTES
- Check baseline LFTs and lipids, then after 1 month of therapy, then repeat only if increasing dose or if abnormal  - Check urine pregnancy test at monthly follow-up. - continue spironolactone    2. On isotretinoin therapy  - patient to send pregnancy test via [employer]    RTC 1 month          There are no Patient Instructions on file for this visit. Photo surveillance performed: No    Follow-up: No follow-ups on file. Pursuant to the emergency declaration under the [address] [place] [address], [facility] waiver authority and the [employer] and [employer] Act, this Virtual  Visit was conducted, with patient's consent, to reduce the patient's risk of exposure to COVID-19 and provide continuity of care for an established patient. Services were provided through a video synchronous discussion virtually to substitute for in-person clinic visit.      Electronically signed by Pieter Lozano MD on 5/11/20 at 9:11 AM EDT

## 2020-10-16 ENCOUNTER — TELEMEDICINE (OUTPATIENT)
Dept: DERMATOLOGY | Age: 30
End: 2020-10-16

## 2020-10-16 PROCEDURE — 99213 OFFICE O/P EST LOW 20 MIN: CPT | Performed by: DERMATOLOGY

## 2020-10-16 NOTE — PROGRESS NOTES
TELEHEALTH EVALUATION -- Audio/Visual (During [facility]-74 public health emergency)    Dermatology Patient Note  [address]  [place] [address]  Dept: [phone]  Dept Fax: [phone]      VISIT DATE: 10/16/2020   REFERRING PROVIDER: No ref. provider found      Glen Red is a 35 y.o. female  who presents today in the office for:    No chief complaint on file. HISTORY OF PRESENT ILLNESS:  HPI Accutane Female Followup:    Rosemary Hilario is on Isotretinoin seen today for her monthly follow-up evaluation. She is also on spironolactone    Current Isotretinoin Dose: 60 mg/day   Months of Completed Treatment: 8 months  Interim Course: acne has improved considerably but she did get a new breakout in the past month. She is worried about stopping accutane  Areas of Involvement: face  Symptoms: acne papules and scars  Start spironolactone last month as well    Primary Form of Contraception: IUD  Secondary Form of Contraception:Condoms  Patient confirms that she has used both forms of contraception simultaneously for the past 30 days. Yes    Side Effects from Isotretinoin: dryness only  Concerns for Mood Changes, Depression, Suicidal Thoughts: none. Laboratory Evaluation Review: Pregnancy Test Results Not Available for Review at the Time of Visit      CURRENT MEDICATIONS:   Current Outpatient Medications   Medication Sig Dispense Refill    ISOtretinoin (ACCUTANE) 30 MG chemo capsule Take 1 capsule by mouth 2 times daily 60 capsule 0    spironolactone (ALDACTONE) 50 MG tablet Take one half tablet PO daily x 7 days, then increase to one full tablet PO daily if tolerating 30 tablet 2    fluocinonide (LIDEX) 0.05 % cream Apply topically 2 times daily to rash on arms (Patient not taking: Reported on 7/30/2020) 30 g 2    levonorgestrel (MIRENA) 20 MCG/24HR IUD 1 each by Intrauterine route once.          No current facility-administered medications for this visit. ALLERGIES:   Allergies   Allergen Reactions    Neosporin [Bacitracin-Neomycin-Polymyxin] Rash       SOCIAL HISTORY:  Social History     Tobacco Use    Smoking status: Never Smoker    Smokeless tobacco: Never Used   Substance Use Topics    Alcohol use: No       REVIEW OF SYSTEMS:  Review of Systems  Skin:Denies any new changing, growing or bleeding lesions or rashes except as described in the HPI     PHYSICAL EXAM:   There were no vitals taken for this visit. General Exam:  General Appearance: No acute distress, Well nourished     Neuro: Alert and oriented to person, place and time  Psych: Normal affect   Lymph Node: Not performed    Cutaneous Exam: Performed as documented in clinic note below. Acne exam, which includes the head/face, neck, chest, and back was performed    Due to this being a TeleHealth encounter, evaluation of the following organ systems is limited: Vitals/Constitutional/EENT/Resp/CV/GI//MS/Neuro/Skin/Heme-Lymph-Imm. In particular examination of the skin is limited by video quality and patient available technology. Pertinent Physical Exam Findings:  Physical Exam  Acneiform scarring of face, few acneiform papules, considerable erythema especially of lower face    Medical Necessity of Exam Performed:   Distribution of patient concerns    Additional Diagnostic Testing performed during exam: Not performed ,  Not performed    ASSESSMENT:   Diagnosis Orders   1. Acne vulgaris     2. On isotretinoin therapy         Plan of Action is as Follows:  Assessment    1.  Acne vulgaris  Isotretinoin Continuation  - Isotretinoin start date: 1/10/20  - Current isotretinoin dose: 60 mg/day  - Cumulative isotretinoin dose: 98042 mg  - Goal isotretinoin dosage: 94739 mg  (69kg * 220 mg/kg) - dosing to 220 mg/kg given resistance to treatment  - 2 forms of contraception include: 1.) IUD and 2.) condoms.    - Patient counseled to not share the medication with anyone nor donate blood while on isotretinoin  - Encouraged liberal use of emollients and sunscreen/sun protection          - Check baseline LFTs and lipids, then after 1 month of therapy, then repeat only if increasing dose or if abnormal  - Check urine pregnancy test at monthly follow-up. - continue spironolactone  - can consider laser treatment when 3 months off isotretinoin    2. On isotretinoin therapy  - patient to send pregnancy test via [employer]    RTC 1 month          There are no Patient Instructions on file for this visit. Photo surveillance performed: No    Follow-up: No follow-ups on file. Pursuant to the emergency declaration under the [address] [place] [address], [facility] waiver authority and the [employer] and [employer] Act, this Virtual  Visit was conducted, with patient's consent, to reduce the patient's risk of exposure to COVID-19 and provide continuity of care for an established patient. Services were provided through a video synchronous discussion virtually to substitute for in-person clinic visit.      Electronically signed by Mariama Velazquez MD on 5/11/20 at 9:11 AM EDT

## 2020-12-11 ENCOUNTER — TELEMEDICINE (OUTPATIENT)
Dept: DERMATOLOGY | Age: 30
End: 2020-12-11

## 2020-12-11 PROCEDURE — 99213 OFFICE O/P EST LOW 20 MIN: CPT | Performed by: DERMATOLOGY

## 2020-12-11 NOTE — PROGRESS NOTES
TELEHEALTH EVALUATION -- Audio/Visual (During TADOE-05 public health emergency)    Dermatology Patient Note  [address]  [place] [address]  Dept: [phone]  Dept Fax: [phone]      VISIT DATE: 12/11/2020   REFERRING PROVIDER: No ref. provider found      Conrad Boas is a 35 y.o. female  who presents today in the office for:    No chief complaint on file. HISTORY OF PRESENT ILLNESS:  HPI Accutane Female Followup:    Maria Del Rosario Johns is on Isotretinoin seen today for her monthly follow-up evaluation. She is also on spironolactone    Current Isotretinoin Dose: 60 mg/day   Months of Completed Treatment: 9 months  Interim Course: no new breakouts, redness has improved  Areas of Involvement: face  Symptoms: acne papules and scars  Start spironolactone last month as well    Primary Form of Contraception: IUD  Secondary Form of Contraception:Condoms  Patient confirms that she has used both forms of contraception simultaneously for the past 30 days. Yes    Side Effects from Isotretinoin: dryness only  Concerns for Mood Changes, Depression, Suicidal Thoughts: none. Laboratory Evaluation Review: Pregnancy Test Results Not Available for Review at the Time of Visit      CURRENT MEDICATIONS:   Current Outpatient Medications   Medication Sig Dispense Refill    spironolactone (ALDACTONE) 50 MG tablet Take one half tablet PO daily x 7 days, then increase to one full tablet PO daily if tolerating 30 tablet 2    fluocinonide (LIDEX) 0.05 % cream Apply topically 2 times daily to rash on arms (Patient not taking: Reported on 7/30/2020) 30 g 2    levonorgestrel (MIRENA) 20 MCG/24HR IUD 1 each by Intrauterine route once. No current facility-administered medications for this visit.         ALLERGIES:   Allergies   Allergen Reactions    Neosporin [Bacitracin-Neomycin-Polymyxin] Rash       SOCIAL HISTORY:  Social History     Tobacco Use    Smoking status: Never Smoker    Smokeless tobacco: Never Used   Substance Use Topics    Alcohol use: No       REVIEW OF SYSTEMS:  Review of Systems  Skin:Denies any new changing, growing or bleeding lesions or rashes except as described in the HPI     PHYSICAL EXAM:   There were no vitals taken for this visit. General Exam:  General Appearance: No acute distress, Well nourished     Neuro: Alert and oriented to person, place and time  Psych: Normal affect   Lymph Node: Not performed    Cutaneous Exam: Performed as documented in clinic note below. Acne exam, which includes the head/face, neck, chest, and back was performed    Due to this being a TeleHealth encounter, evaluation of the following organ systems is limited: Vitals/Constitutional/EENT/Resp/CV/GI//MS/Neuro/Skin/Heme-Lymph-Imm. In particular examination of the skin is limited by video quality and patient available technology. Pertinent Physical Exam Findings:  Physical Exam  Face is clear. Redness is improved    Medical Necessity of Exam Performed:   Distribution of patient concerns    Additional Diagnostic Testing performed during exam: Not performed ,  Not performed    ASSESSMENT:   Diagnosis Orders   1. Acne vulgaris     2. On isotretinoin therapy         Plan of Action is as Follows:  Assessment    1.  Acne vulgaris  Isotretinoin Completion  - Isotretinoin start date: 1/10/20  - Current isotretinoin dose: 60 mg/day  - Cumulative isotretinoin dose: 06771 mg  - Goal isotretinoin dosage: 46092 mg  (69kg * 220 mg/kg) - dosing to 220 mg/kg given resistance to treatment  - 2 forms of contraception include: 1.) IUD and 2.) condoms.    - Patient counseled to not share the medication with anyone nor donate blood while on isotretinoin  - Encouraged liberal use of emollients and sunscreen/sun protection          - Check baseline LFTs and lipids, then after 1 month of therapy, then repeat only if increasing dose or if abnormal  - Check urine pregnancy test at monthly

## 2020-12-11 NOTE — Clinical Note
Patient has completed isotretinoin treatment. Will send post-therapy preg test    6 mo f/u.  In person or virtual

## 2021-01-13 ENCOUNTER — TELEPHONE (OUTPATIENT)
Dept: DERMATOLOGY | Age: 31
End: 2021-01-13

## 2021-01-13 DIAGNOSIS — L70.0 ACNE VULGARIS: ICD-10-CM

## 2021-01-13 RX ORDER — SPIRONOLACTONE 50 MG/1
TABLET, FILM COATED ORAL
Qty: 30 TABLET | Refills: 5 | Status: SHIPPED | OUTPATIENT
Start: 2021-01-13 | End: 2022-02-25

## 2021-03-22 ENCOUNTER — OFFICE VISIT (OUTPATIENT)
Dept: OBGYN CLINIC | Age: 31
End: 2021-03-22
Payer: COMMERCIAL

## 2021-03-22 VITALS
DIASTOLIC BLOOD PRESSURE: 60 MMHG | BODY MASS INDEX: 27.16 KG/M2 | SYSTOLIC BLOOD PRESSURE: 102 MMHG | TEMPERATURE: 98.3 F | HEIGHT: 65 IN | WEIGHT: 163 LBS

## 2021-03-22 DIAGNOSIS — Z30.432 ENCOUNTER FOR IUD REMOVAL: Primary | ICD-10-CM

## 2021-03-22 PROCEDURE — 58301 REMOVE INTRAUTERINE DEVICE: CPT | Performed by: OBSTETRICS & GYNECOLOGY

## 2021-03-22 NOTE — PROGRESS NOTES
Adán Gusman  3/22/2021  No LMP recorded. Patient has had an implant. HPI:The patient is requesting that her IUD be removed as she is NOT planning on becoming pregnant. The patient was counseled on the procedure. Risks, benefits and alternatives were reviewed. A consent was reviewed and obtained. The patient denies that she has been completing her string checks as directed. She has no other chief complaint today. All other forms of birth control both male and female reversible and non were reviewed with the patient. She is not a smoker. The patient denies any family member or herself as having a blood clot in their leg, lung, or brain. She denies that any member of her family has had a sudden cardiac death under the age of 47 yo. Past Medical History:   Diagnosis Date    Asthma     Attention deficit disorder (ADD)     Depression     LSIL (low grade squamous intraepithelial lesion) on Pap smear 9/12/12    LSIL (low grade squamous intraepithelial lesion) on Pap smear        History reviewed. No pertinent surgical history. Social History     Tobacco Use    Smoking status: Never Smoker    Smokeless tobacco: Never Used   Substance Use Topics    Alcohol use: No    Drug use: No           MEDICATIONS:  Current Outpatient Medications   Medication Sig Dispense Refill    spironolactone (ALDACTONE) 50 MG tablet Take one tablet PO daily (Patient not taking: Reported on 3/22/2021) 30 tablet 5    fluocinonide (LIDEX) 0.05 % cream Apply topically 2 times daily to rash on arms (Patient not taking: Reported on 7/30/2020) 30 g 2    levonorgestrel (MIRENA) 20 MCG/24HR IUD 1 each by Intrauterine route once. No current facility-administered medications for this visit.           ALLERGIES:  Allergies as of 03/22/2021 - Review Complete 03/22/2021   Allergen Reaction Noted    Neosporin [bacitracin-neomycin-polymyxin] Rash 08/22/2011         Blood pressure 102/60, temperature 98.3 °F (36.8 °C), height 5' 5\" (1.651 m), weight 163 lb (73.9 kg), not currently breastfeeding. >    Chaperone for Intimate Exam   Chaperone was offered and accepted as part of the rooming process.  Chaperone: Prabha Early        Urine pregnancy test: negative    The patient was positioned comfortably on the exam table. After a bi-manual exam; the uterus was found to be  anteverted. There was no cervical motion tenderness or adnexal masses. The bladder was smooth, non-tender and without palpable masses. A sterile speculum was placed without incident and the string was identified at the cervical portio. The site was then cleansed with betadine and the string was grasped with a ring forcep and the IUD was removed without incident. The IUD was sent to pathology in separate specimen container for macro only. Post procedure restrictions were reviewed and given to the patient. She was instructed to use barrier protection for sexually transmitted disease prevention. She has elected to use not sexually active/ abstinence as an adjunct to the barrier protection and she was counseled on its use. She is aware that her new hormonal based birth control takes a minimum of thirty days before it becomes effective and any antibiotic use decreases its efficacy. Alternate barrier contraception would be warranted for a thirty day window, beginning from the onset of the antibiotic dosing schedule, even while continuing on her hormonal based contraception. SEE CONSENT FORM FOR SMOKING PATIENTS: No      The patient was seen with total face to face time of 20 minutes. More than 50% of this visit was on counseling and education regarding family planning. She was also counseled on her preventative health maintenance recommendations and follow-up. ASSESSMENT:   Diagnosis Orders   1. Encounter for IUD removal  ID REMOVE INTRAUTERINE DEVICE    Specimen to Pathology Outpatient     IUD Removal  Family Planning   reports that she has never smoked.  She has never used smokeless tobacco.  Patient Active Problem List    Diagnosis Date Noted    LGSIL of cervix of undetermined significance     Asthma     Depression     Attention deficit disorder (ADD)        PLAN:  Family Planning Counseling Completed  Rx none  Barrier Recommendations   Annual health follow-up  7/2021  Return to office in 6 months    Orders Placed This Encounter   Procedures    Specimen to Pathology Outpatient     Standing Status:   Future     Standing Expiration Date:   3/17/2022     Order Specific Question:   PREVIOUS BIOPSY     Answer:   No     Order Specific Question:   PREOP DIAGNOSIS     Answer:   IUD removal     Order Specific Question:   FROZEN SECTION - NO OR YES/SPECIMEN     Answer:   No    DC REMOVE INTRAUTERINE DEVICE

## 2021-03-25 DIAGNOSIS — Z30.432 ENCOUNTER FOR IUD REMOVAL: ICD-10-CM

## 2021-04-14 ENCOUNTER — OFFICE VISIT (OUTPATIENT)
Dept: OBGYN CLINIC | Age: 31
End: 2021-04-14
Payer: COMMERCIAL

## 2021-04-14 ENCOUNTER — HOSPITAL ENCOUNTER (OUTPATIENT)
Age: 31
Setting detail: SPECIMEN
Discharge: HOME | End: 2021-04-14

## 2021-04-14 ENCOUNTER — HOSPITAL ENCOUNTER (OUTPATIENT)
Age: 31
Setting detail: SPECIMEN
End: 2021-04-14

## 2021-04-14 VITALS
SYSTOLIC BLOOD PRESSURE: 108 MMHG | WEIGHT: 163.4 LBS | BODY MASS INDEX: 27.22 KG/M2 | HEIGHT: 65 IN | HEART RATE: 78 BPM | TEMPERATURE: 98.4 F | DIASTOLIC BLOOD PRESSURE: 72 MMHG

## 2021-04-14 DIAGNOSIS — N92.1 MENORRHAGIA WITH IRREGULAR CYCLE: ICD-10-CM

## 2021-04-14 DIAGNOSIS — R10.2 PELVIC PAIN: ICD-10-CM

## 2021-04-14 DIAGNOSIS — N92.1 MENORRHAGIA WITH IRREGULAR CYCLE: Primary | ICD-10-CM

## 2021-04-14 LAB
ABSOLUTE EOS #: 0.4 K/UL (ref 0–0.4)
ABSOLUTE IMMATURE GRANULOCYTE: ABNORMAL K/UL (ref 0–0.3)
ABSOLUTE LYMPH #: 1.5 K/UL (ref 1–4.8)
ABSOLUTE MONO #: 0.5 K/UL (ref 0.1–1.3)
BASOPHILS # BLD: 2 % (ref 0–2)
BASOPHILS ABSOLUTE: 0.1 K/UL (ref 0–0.2)
DIFFERENTIAL TYPE: ABNORMAL
DIRECT EXAM: ABNORMAL
EOSINOPHILS RELATIVE PERCENT: 8 % (ref 0–4)
HCG QUANTITATIVE: <1 IU/L
HCT VFR BLD CALC: 41.2 % (ref 36–46)
HEMOGLOBIN: 13.7 G/DL (ref 12–16)
IMMATURE GRANULOCYTES: ABNORMAL %
INR BLD: 1
LYMPHOCYTES # BLD: 30 % (ref 24–44)
Lab: ABNORMAL
MCH RBC QN AUTO: 30.1 PG (ref 26–34)
MCHC RBC AUTO-ENTMCNC: 33.2 G/DL (ref 31–37)
MCV RBC AUTO: 90.6 FL (ref 80–100)
MONOCYTES # BLD: 9 % (ref 1–7)
NRBC AUTOMATED: ABNORMAL PER 100 WBC
PARTIAL THROMBOPLASTIN TIME: 33.5 SEC (ref 24–36)
PDW BLD-RTO: 13 % (ref 11.5–14.9)
PLATELET # BLD: 321 K/UL (ref 150–450)
PLATELET ESTIMATE: ABNORMAL
PMV BLD AUTO: 8.5 FL (ref 6–12)
PROTHROMBIN TIME: 13.3 SEC (ref 11.8–14.6)
RBC # BLD: 4.54 M/UL (ref 4–5.2)
RBC # BLD: ABNORMAL 10*6/UL
SEG NEUTROPHILS: 51 % (ref 36–66)
SEGMENTED NEUTROPHILS ABSOLUTE COUNT: 2.6 K/UL (ref 1.3–9.1)
SPECIMEN DESCRIPTION: ABNORMAL
TSH SERPL DL<=0.05 MIU/L-ACNC: 1.02 MIU/L (ref 0.3–5)
WBC # BLD: 5.1 K/UL (ref 3.5–11)
WBC # BLD: ABNORMAL 10*3/UL

## 2021-04-14 PROCEDURE — 36415 COLL VENOUS BLD VENIPUNCTURE: CPT

## 2021-04-14 PROCEDURE — 84702 CHORIONIC GONADOTROPIN TEST: CPT

## 2021-04-14 PROCEDURE — 87480 CANDIDA DNA DIR PROBE: CPT

## 2021-04-14 PROCEDURE — 87510 GARDNER VAG DNA DIR PROBE: CPT

## 2021-04-14 PROCEDURE — 99213 OFFICE O/P EST LOW 20 MIN: CPT | Performed by: NURSE PRACTITIONER

## 2021-04-14 PROCEDURE — 87660 TRICHOMONAS VAGIN DIR PROBE: CPT

## 2021-04-14 PROCEDURE — 85730 THROMBOPLASTIN TIME PARTIAL: CPT

## 2021-04-14 PROCEDURE — 85610 PROTHROMBIN TIME: CPT

## 2021-04-14 PROCEDURE — 84443 ASSAY THYROID STIM HORMONE: CPT

## 2021-04-14 PROCEDURE — 87591 N.GONORRHOEAE DNA AMP PROB: CPT

## 2021-04-14 PROCEDURE — 87491 CHLMYD TRACH DNA AMP PROBE: CPT

## 2021-04-14 PROCEDURE — 85025 COMPLETE CBC W/AUTO DIFF WBC: CPT

## 2021-04-14 NOTE — PROGRESS NOTES
Serg Hyde  2021    YOB: 1990          The patient was seen today. She is here regarding heavy menses since IUD removed with DR Cecilia Gupta on 3/22/2021. Had mirena IUD in for 5 years and previously had IUD in 5 years prior to this. Had been experiencing sharp cramping pain in vagina a few weeks prior to removal. Pain has improved but is still experiencing pelvic pain. Been bleeding since IUD removed. Heavy at times, passing clots smaller than quarter. Complaining of fatigue and difficulty sleeping since IUD removed and her bleeding has been heavy. Not interested in hormonal contraception - wants to give her body a break. Signs hormonal contraception form today. Her bowels are regular and she is voiding without difficulty. HPI:  Serg Hyde is a 35 y.o. female        Heavy bleeding since IUD removed  Pelvic pain    OB History    Para Term  AB Living   1 1 1 0 0 1   SAB TAB Ectopic Molar Multiple Live Births   0 0 0 0 0 1      # Outcome Date GA Lbr Sal/2nd Weight Sex Delivery Anes PTL Lv   1 Term 11 40w0d  7 lb 4 oz (3.289 kg) M Vag-Spont   MARKELL      Name: Javy Speak: 9  Apgar5: 9       Past Medical History:   Diagnosis Date    Asthma     Attention deficit disorder (ADD)     Depression     LSIL (low grade squamous intraepithelial lesion) on Pap smear 12    LSIL (low grade squamous intraepithelial lesion) on Pap smear        History reviewed. No pertinent surgical history.     Family History   Problem Relation Age of Onset    Diabetes Father     Cirrhosis Father     Heart Disease Mother         tachycardia    Depression Mother     Anemia Mother     Other Mother         bulemia       Social History     Socioeconomic History    Marital status: Single     Spouse name: Not on file    Number of children: Not on file    Years of education: Not on file    Highest education level: Not on file   Occupational History ROS: No Depression, Homicidal thoughts,suicidal thoughts, or anxiety  Breast ROS: No prior breast abnormalities or lumps  Respiratory ROS: No SOB, Pneumoniae,Cough, or Pulmonary Embolism History  Cardiovascular ROS: No Chest Pain with Exertion, Palpitations, Syncope, Edema, Arrhythmia  Gastrointestinal ROS: No Indigestion, Heartburn, Nausea, vomiting, Diarrhea, Constipation,or Bowel Changes; No Bloody Stools or melena  Genito-Urinary ROS: No Dysuria, Hematuria or Nocturia. No Urinary Incontinence or Vaginal Discharge. + heavy bleeding, pelvic pain  Musculoskeletal ROS: No Arthralgia, Arthritis,Gout,Osteoporosis or Rheumatism  Neurological ROS: No CVA, Migraines, Epilepsy, Seizure Hx, or Limb Weakness  Dermatological ROS: No Rash, Itching, Hives, Mole Changes or Cancer          Blood pressure 108/72, pulse 78, temperature 98.4 °F (36.9 °C), temperature source Temporal, height 5' 5\" (1.651 m), weight 163 lb 6.4 oz (74.1 kg), not currently breastfeeding. Chaperone for Intimate Exam   Chaperone was offered and accepted as part of the rooming process.  Chaperone: Elvi         Abdomen: Soft non-tender; good bowel sounds. No guarding, rebound or rigidity. No CVA tenderness bilaterally. Extremities: No calf tenderness, DTR 2/4, and No edema bilaterally    Pelvic: Vulva and vagina appear normal. Bimanual exam reveals normal uterus and adnexa. Diagnostics:  No results found. Lab Results:  Results for orders placed or performed during the hospital encounter of 07/30/20   VAGINITIS DNA PROBE    Specimen: Vaginal   Result Value Ref Range    Specimen Description . VAGINA     Special Requests NOT REPORTED     Direct Exam NEGATIVE for Gardnerella vaginalis     Direct Exam NEGATIVE for Trichomonas vaginalis     Direct Exam NEGATIVE for Candida sp.      Direct Exam       Method of testing is a DNA probe intended for detection and identification of Candida species, Gardnerella vaginalis, and Trichomonas vaginalis nucleic birth control may increase her risk of developing a blood clot which may increase her morbidity and or mortality. She was counseled on alternate non hormonal based contraception options. We discussed that smoking and any hormonal based contraception may increase the patients risks of developing these life threatening blood clots. All patients are encouraged to stop smoking at the time of contraceptive counseling. Cessation programs were reviewed. The patient was instructed to use barrier contraception for sexually transmitted disease prevention. The patient was also informed of antibiotics decreasing contraceptive efficacy and the need for barrier contraception from the onset of her antibiotic dosing and through a minimum of thirty days from antibiotic cessation. The life threatening side effect profile was reviewed in detail this includes but is not limited to shortness of breath, chest pain, severe or persistent headaches, or calf pain. If any of these occur the patient has been instructed to stop using her hormonal based contraception, notify the office, and go to the emergency department or call 911. The patient denied any personal history of blood clots in her leg, lung, or heart and denied any family history of stroke, TIA, sudden cardiac death < 36 y.o.,pulmonary embolism, or deep venous thrombosis. PLAN:  Return in about 3 weeks (around 5/5/2021) for virtual visit/ DR Elizabet Manning heavy menses. Pelvic ultrasound  Lab slips. Signs hormone consent form. Repeat Annual every 1 year  Cervical Cytology Evaluation begins at 24years old. If Negative Cytology, Follow-up screening per current guidelines. Return to the office in 3 weeks. Counseled on preventative health maintenance follow-up.   Orders Placed This Encounter   Procedures    VAGINITIS DNA PROBE     Standing Status:   Future     Standing Expiration Date:   4/14/2022    C.trachomatis N.gonorrhoeae DNA     Standing Status:   Future Standing Expiration Date:   4/14/2022    US PELVIS COMPLETE     Standing Status:   Future     Standing Expiration Date:   7/14/2021     Order Specific Question:   Reason for exam:     Answer:   heavy menses    US NON OB TRANSVAGINAL     Standing Status:   Future     Standing Expiration Date:   10/14/2021     Order Specific Question:   Reason for exam:     Answer:   heavy menses    TSH with Reflex     Standing Status:   Future     Number of Occurrences:   1     Standing Expiration Date:   4/14/2022    CBC Auto Differential     Standing Status:   Future     Number of Occurrences:   1     Standing Expiration Date:   4/14/2022    HCG, Quantitative, Pregnancy     Standing Status:   Future     Number of Occurrences:   1     Standing Expiration Date:   4/14/2022    APTT     Standing Status:   Future     Number of Occurrences:   1     Standing Expiration Date:   4/14/2022     Order Specific Question:   Daily Heparin Dose? Answer:   Romel Madrigals Protime-INR     Standing Status:   Future     Number of Occurrences:   1     Standing Expiration Date:   4/14/2022     Order Specific Question:   Daily Coumadin Dose? Answer:   N           The patient, Manny Caballero is a 35 y.o. female, was seen with a total time spent of 20 minutes for the visit on this date of service by the E/M provider. The time component had both face to face and non face to face time spent in determining the total time component. Counseling and education regarding her diagnosis listed below and her options regarding those diagnoses were also included in determining her time component. Diagnosis Orders   1. Menorrhagia with irregular cycle  US PELVIS COMPLETE    US NON OB TRANSVAGINAL    TSH with Reflex    CBC Auto Differential    HCG, Quantitative, Pregnancy    APTT    Protime-INR   2.  Pelvic pain  VAGINITIS DNA PROBE    C.trachomatis N.gonorrhoeae DNA    US PELVIS COMPLETE    US NON OB TRANSVAGINAL        The patient had her preventative health maintenance recommendations and follow-up reviewed with her at the completion of her visit.

## 2021-04-15 ENCOUNTER — TELEPHONE (OUTPATIENT)
Dept: OBGYN CLINIC | Age: 31
End: 2021-04-15

## 2021-04-15 RX ORDER — METRONIDAZOLE 500 MG/1
500 TABLET ORAL 2 TIMES DAILY
Qty: 14 TABLET | Refills: 0 | Status: SHIPPED | OUTPATIENT
Start: 2021-04-15 | End: 2021-04-22

## 2021-04-15 NOTE — TELEPHONE ENCOUNTER
----- Message from SHONDA Chavez NP sent at 4/15/2021  8:12 AM EDT -----  + BV  Flagyl 500mg PO BID x 7 days

## 2021-06-07 ENCOUNTER — TELEMEDICINE (OUTPATIENT)
Dept: DERMATOLOGY | Age: 31
End: 2021-06-07
Payer: COMMERCIAL

## 2021-06-07 DIAGNOSIS — L70.0 ACNE VULGARIS: Primary | ICD-10-CM

## 2021-06-07 PROCEDURE — 99213 OFFICE O/P EST LOW 20 MIN: CPT | Performed by: DERMATOLOGY

## 2021-06-08 NOTE — PROGRESS NOTES
TELEHEALTH EVALUATION -- Audio/Visual (During OEGMI-55 public health emergency)    Dermatology Patient Note  [id number] [address]  [address]  Dept: [phone]  Dept Fax: [phone]      VISITDATE: 6/7/2021   REFERRING PROVIDER: No ref. provider found      Madeline Seay is a 28 y.o. female  who presents today in the office for:    No chief complaint on file. PERTINENT HISTORY NOT LISTED ABOVE:  Patient presents for f/u acne  - completed isotretinoin course 6 months ago  - no recurrence of acne  - some redness and pitted scars, has had some facials  - using retinol    CURRENT MEDICATIONS:   Current Outpatient Medications   Medication Sig Dispense Refill    tretinoin (RETIN-A) 0.025 % cream Apply pea sized amount to face nightly 45 g 5    spironolactone (ALDACTONE) 50 MG tablet Take one tablet PO daily 30 tablet 5     No current facility-administered medications for this visit. ALLERGIES:   Allergies   Allergen Reactions    Neosporin [Bacitracin-Neomycin-Polymyxin] Rash       SOCIAL HISTORY:  Social History     Tobacco Use    Smoking status: Never Smoker    Smokeless tobacco: Never Used   Substance Use Topics    Alcohol use: No       Pertinent ROS:  Review of Systems  Skin: Denies any new changing, growing or bleeding lesions or rashes except as described in the HPI   Constitutional: Denies fevers, chills, and malaise. PHYSICAL EXAM:     Due to this being a TeleHealth encounter, evaluation of the following organ systems is limited: Vitals/Constitutional/EENT/Resp/CV/GI//MS/Neuro/Skin/Heme-Lymph-Imm. In particular examination of the skin is limited by video quality and patient available technology. There were no vitals taken for this visit.     The patient is generally well appearing, well nourished, alert and conversational. Affect is normal.    Cutaneous Exam:  Physical Exam  Sun-exposed skin, which includes the head/face, neck, both arms, digits and/or nails was examined. Diagnoses/exam findings/medical history pertinent to this visit are listed below:    Assessment and Plan:  Assessment   1. Acne vulgaris  - stable chronic illness, resolved with scarring  - recommend laser (fraxel, pdl, or microneedling) for redness and scarring ([facility])  - topical oxymetazoline for redness (otc afrin mixed with lotion)  - sun protection  - tretinoin (RETIN-A) 0.025 % cream; Apply pea sized amount to face nightly  Dispense: 45 g; Refill: 5          RTC prn    There are no Patient Instructions on file for this visit. This note was created with the assistance of a speech-recognition program.  Although the intention is to generate a document that actually reflects the content of the visit, no guarantees can be provided that every mistake has been identified and corrected byediting. Pursuant to the emergency declaration under the [facility], [facility] waiver authority and the [employer] Act, this Virtual  Visit was conducted, with patient's consent, to reduce the patient's risk of exposure to COVID-19 and provide continuity of care for an established patient. Services were provided through a video synchronous discussion virtually to substitute for in-person clinic visit.      Electronically signed by Annie Acosta MD on 6/7/21 at 11:11 PM EDT

## 2021-09-02 ENCOUNTER — TELEPHONE (OUTPATIENT)
Dept: OBGYN CLINIC | Age: 31
End: 2021-09-02

## 2021-09-02 RX ORDER — METRONIDAZOLE 500 MG/1
500 TABLET ORAL 2 TIMES DAILY
Qty: 14 TABLET | Refills: 0 | Status: SHIPPED | OUTPATIENT
Start: 2021-09-02 | End: 2021-09-21 | Stop reason: SDUPTHER

## 2021-09-13 ENCOUNTER — OFFICE VISIT (OUTPATIENT)
Dept: OBGYN CLINIC | Age: 31
End: 2021-09-13
Payer: COMMERCIAL

## 2021-09-13 VITALS
WEIGHT: 168 LBS | DIASTOLIC BLOOD PRESSURE: 70 MMHG | HEIGHT: 65 IN | SYSTOLIC BLOOD PRESSURE: 112 MMHG | BODY MASS INDEX: 27.99 KG/M2

## 2021-09-13 DIAGNOSIS — N76.0 ACUTE VAGINITIS: ICD-10-CM

## 2021-09-13 DIAGNOSIS — Z01.419 WELL FEMALE EXAM WITH ROUTINE GYNECOLOGICAL EXAM: Primary | ICD-10-CM

## 2021-09-13 DIAGNOSIS — Z11.51 SPECIAL SCREENING EXAMINATION FOR HUMAN PAPILLOMAVIRUS (HPV): ICD-10-CM

## 2021-09-13 PROCEDURE — 99395 PREV VISIT EST AGE 18-39: CPT | Performed by: NURSE PRACTITIONER

## 2021-09-13 NOTE — PROGRESS NOTES
History and Physical  [address], [address] [facility] [address], [address] [address] [address] [phone]   Fax [phone]  Conrad Boas  2021              28 y.o. Chief Complaint   Patient presents with    Annual Exam       Patient's last menstrual period was 2021. Primary Care Physician: No primary care provider on file. The patient was seen and examined. She is here for her annual exam. C/o vaginal discharge. Her bowels are regular. There are no voiding complaints. She denies any bloating. She denies vaginal discharge and was counseled on STD's and the need for barrier contraception. HPI : Conrad Boas is a 28 y.o. female     Annual exam  Vaginal discharge       ________________________________________________________________________  OB History    Para Term  AB Living   1 1 1 0 0 1   SAB TAB Ectopic Molar Multiple Live Births   0 0 0 0 0 1      # Outcome Date GA Lbr Sal/2nd Weight Sex Delivery Anes PTL Lv   1 Term 11 40w0d  7 lb 4 oz (3.289 kg) M Vag-Spont   MARKELL      Name: Stacey Lopez: 9  Apgar5: 9     Past Medical History:   Diagnosis Date    Asthma     Attention deficit disorder (ADD)     Depression     LSIL (low grade squamous intraepithelial lesion) on Pap smear 12    LSIL (low grade squamous intraepithelial lesion) on Pap smear                                                                    History reviewed. No pertinent surgical history.   Family History   Problem Relation Age of Onset    Diabetes Father     Cirrhosis Father     Heart Disease Mother         tachycardia    Depression Mother     Anemia Mother     Other Mother         bulemia     Social History     Socioeconomic History    Marital status: Single     Spouse name: Not on file    Number of children: Not on file    Years of education: Not on file    Highest education level: Not on file   Occupational History  Not on file   Tobacco Use    Smoking status: Never Smoker    Smokeless tobacco: Never Used   Substance and Sexual Activity    Alcohol use: No    Drug use: No    Sexual activity: Yes     Partners: Male   Other Topics Concern    Not on file   Social History Narrative    Not on file     Social Determinants of Health     Financial Resource Strain:     Difficulty of Paying Living Expenses:    Food Insecurity:     Worried About Running Out of Food in the Last Year:     [address] in the Last Year:    Transportation Needs:     Lack of Transportation (Medical):  Lack of Transportation (Non-Medical):    Physical Activity:     Days of Exercise per Week:     Minutes of Exercise per Session:    Stress:     Feeling of Stress :    Social Connections:     Frequency of Communication with Friends and Family:     Frequency of Social Gatherings with Friends and Family:     Attends [employer] Services:     Active Member of Clubs or Organizations:     Attends Club or Organization Meetings:     Marital Status:    Intimate Partner Violence:     Fear of Current or Ex-Partner:     Emotionally Abused:     Physically Abused:     Sexually Abused:        MEDICATIONS:  Current Outpatient Medications   Medication Sig Dispense Refill    spironolactone (ALDACTONE) 50 MG tablet Take one tablet PO daily 30 tablet 5     No current facility-administered medications for this visit. ALLERGIES:  Allergies as of 09/13/2021 - Fully Reviewed 09/13/2021   Allergen Reaction Noted    Neosporin [bacitracin-neomycin-polymyxin] Rash 08/22/2011       Symptoms of decreased mood absent  Symptoms of anhedonia absent    **If either question is answered in a  positive fashion then complete the PHQ9 Scoring Evaluation and make the appropriate referral**      Immunization status: stated as current, but no records available.       Gynecologic History:  Menarche: 16 yo  Menopause at [address] [facility] yo     Patient's last menstrual period was 09/06/2021. Sexually Active: Yes    STD History: No     Permanent Sterilization: No   Reversible Birth Control: No        Hormone Replacement Exposure: No      Genetic Qualified Family History of Breast, Ovarian , Colon or Uterine Cancer: No     If YES see scanned worksheet. Preventative Health Testing:    Health Maintenance:  Health Maintenance Due   Topic Date Due    Hepatitis C screen  Never done    Varicella vaccine (1 of 2 - 2-dose childhood series) Never done    Pneumococcal 0-64 years Vaccine (1 of 2 - PPSV23) Never done    COVID-19 Vaccine (1) Never done    HIV screen  Never done    DTaP/Tdap/Td vaccine (1 - Tdap) Never done    Potassium monitoring  04/09/2021    Creatinine monitoring  04/09/2021    Flu vaccine (1) 09/01/2021       Date of Last Pap Smear: 7/30/2020 neg  Abnormal Pap Smear History: denies  Colposcopy History:   Date of Last Mammogram: NA  Date of Last Colonoscopy:   Date of Last Bone Density:      ________________________________________________________________________        REVIEW OF SYSTEMS:    yes   A minimum of an eleven point review of systems was completed. Review Of Systems (11 point):  Constitutional: No fever, chills or malaise; No weight change or fatigue  Head and Eyes: No vision changes, Headache, Dizziness or trauma in last 12 months  ENT ROS: No hearing, Tinnitis, sinus or taste problems  Hematological and Lymphatic ROS:No Lymphoma, Von Willebrand's, Hemophillia or Bleeding History  Psych ROS: No Depression, Homicidal thoughts,suicidal thoughts, or anxiety  Breast ROS: No breast abnormalities or lumps  Respiratory ROS: No SOB, Pneumoniae,Cough, or Pulmonary Embolism   Cardiovascular ROS: No Chest Pain with Exertion, Palpitations, Syncope, Edema, Arrhythmia  Gastrointestinal ROS: No Indigestion, Heartburn, Nausea, vomiting, Diarrhea, Constipation,or Bowel Changes; No Bloody Stools or melena  Genito-Urinary ROS: No Dysuria, Hematuria or Nocturia.  No Urinary Incontinence or Vaginal Discharge  Musculoskeletal ROS: No Arthralgia, Arthritis,Gout,Osteoporosis or Rheumatism  Neurological ROS: No CVA, Migraines, Epilepsy, Seizure Hx, or Limb Weakness  Dermatological ROS: No Rash, Itching, Hives, Mole Changes or Cancer                                                                                                                                                                                                                                  PHYSICAL Exam:     Constitutional:  Vitals:    09/13/21 1242   BP: 112/70   Site: Left Upper Arm   Position: Sitting   Cuff Size: Medium Adult   Weight: 168 lb (76.2 kg)   Height: 5' 5\" (1.651 m)       Chaperone for Intimate Exam   Chaperone was offered and accepted as part of the rooming process.  Chaperone: Jaimee MA          General Appearance: This  is a well Developed, well Nourished, well groomed female. Her BMI was reviewed. Nutritional decision making was discussed. Skin:  There was a Normal Inspection of the skin without rashes or lesions. There were no rashes. (Papular, Maculopapular, Hives, Pustular, Macular)     There were no lesions (Ulcers, Erythema, Abn. Appearing Nevi)            Lymphatic:  No Lymph Nodes were Palpable in the neck , axilla or groin.  0 # Of Lymph Nodes; Location ; Character [Normal]  [Shotty] [Tender] [Enlarged]     Neck and EENT:  The neck was supple. There were no masses   The thyroid was not enlarged and had no masses. Perrla, EOMI B/L, TMI B/L No Abnormalities. Throat inspected-No exudates or Masses, Nares Patent No Masses        Respiratory: The lungs were auscultated and found to be clear. There were no rales, rhonchi or wheezes. There was a good respiratory effort. Cardiovascular: The heart was in a regular rate and rhythm. . No S3 or S4. There was no murmur appreciated. Location, grade, and radiation are not applicable. Extremities:   The patients extremities were without calf tenderness, edema, or varicosities. There was full range of motion in all four extremities. Pulses in all four extremities were appreciated and are 2/4. Abdomen: The abdomen was soft and non-tender. There were good bowel sounds in all quadrants and there was no guarding, rebound or rigidity. On evaluation there was no evidence of hepatosplenomegaly and there was no costal vertebral dean tenderness bilaterally. No hernias were appreciated. Abdominal Scars: none    Psych: The patient had a normal Orientation to: Time, Place, Person, and Situation  There is no Mood / Affect changes    Breast:  (Chest)  normal appearance, no masses or tenderness  Self breast exams were reviewed in detail. Literature was given. Pelvic Exam:  Vulva and vagina appear normal. Bimanual reveals normal uterus and adnexa     Rectal Exam:  exam declined by patient          Musculosk:  Normal Gait and station was noted. Digits were evaluated without abnormal findings. Range of motion, stability and strength were evaluated and found to be appropriate for the patients age. ASSESSMENT:      28 y.o. Annual   Diagnosis Orders   1. Well female exam with routine gynecological exam  PAP SMEAR   2. Special screening examination for human papillomavirus (HPV)  PAP SMEAR   3. Acute vaginitis  VAGINITIS DNA PROBE    C.trachomatis N.gonorrhoeae DNA          Chief Complaint   Patient presents with    Annual Exam          Past Medical History:   Diagnosis Date    Asthma     Attention deficit disorder (ADD)     Depression     LSIL (low grade squamous intraepithelial lesion) on Pap smear 9/12/12    LSIL (low grade squamous intraepithelial lesion) on Pap smear          Patient Active Problem List    Diagnosis Date Noted    LGSIL of cervix of undetermined significance     Asthma     Depression     Attention deficit disorder (ADD)           Hereditary Breast, Ovarian, Colon and Uterine Cancer screening Done.           Tobacco & Secondary smoke risks reviewed; instructed on cessation and avoidance      Counseling Completed:  Preventative Health Recommendations and Follow up. The patient was informed of the recommended preventative health recommendations. 1. Annuals every year; Cytology collections per prevailing guidelines. 2. Mammograms begin every year at 35 yo if no abnormalities are found and no family history. 3. Bone density studies every 2-3 years. Begin at 73 yo. If no fracture history or osteoporosis family history. (significant). 4. Colonoscopy begin at 40 yo. Repeat every ten years if negative and no family history. 5. Calcium of 5936-6407 mg/day in split dosing  6. Vitamin D 400-800 IU/day  7. All other preventative health recommendations will be managed by the patients Primary care physician. PLAN:  Return in about 1 year (around 9/13/2022) for annual.   Vaginal cultures   Pap smear collected   Repeat Annual every 1 year  Cervical Cytology Evaluation begins at 24years old. If Negative Cytology, Follow-up screening per current guidelines. Mammograms every 1 year. If 35 yo and last mammogram was negative. Calcium and Vitamin D dosing reviewed. Colonoscopy screening reviewed as well as onset for bone density testing. Birth control and barrier recommendations discussed. STD counseling and prevention reviewed. Gardisil counseling completed for all patients 10-35 yo. Routine health maintenance per patients PCP. Orders Placed This Encounter   Procedures    VAGINITIS DNA PROBE     Standing Status:   Future     Standing Expiration Date:   9/13/2022    C.trachomatis N.gonorrhoeae DNA     Standing Status:   Future     Standing Expiration Date:   9/13/2022    PAP SMEAR     Patient History:    No LMP recorded. OBGYN Status: Having periods  No past surgical history on file.       Social History    Tobacco Use      Smoking status: Never Smoker      Smokeless tobacco: Never Used       Standing Status:   Future Standing Expiration Date:   9/10/2022     Order Specific Question:   Collection Type     Answer: Thin Prep     Order Specific Question:   Prior Abnormal Pap Test     Answer:   No     Order Specific Question:   Screening or Diagnostic     Answer:   Screening     Order Specific Question:   HPV Requested? Answer:   Yes     Order Specific Question:   High Risk Patient     Answer:   N/A           The patient, Rm Carrington is a 28 y.o. female, was seen with a total time spent of 30 minutes for the visit on this date of service by the E/M provider. The time component had both face to face and non face to face time spent in determining the total time component. Counseling and education regarding her diagnosis listed below and her options regarding those diagnoses were also included in determining her time component. Diagnosis Orders   1. Well female exam with routine gynecological exam  PAP SMEAR   2. Special screening examination for human papillomavirus (HPV)  PAP SMEAR   3. Acute vaginitis  VAGINITIS DNA PROBE    C.trachomatis N.gonorrhoeae DNA        The patient had her preventative health maintenance recommendations and follow-up reviewed with her at the completion of her visit.

## 2021-09-21 ENCOUNTER — TELEPHONE (OUTPATIENT)
Dept: OBGYN CLINIC | Age: 31
End: 2021-09-21

## 2021-09-21 RX ORDER — METRONIDAZOLE 500 MG/1
500 TABLET ORAL 2 TIMES DAILY
Qty: 14 TABLET | Refills: 0 | Status: SHIPPED | OUTPATIENT
Start: 2021-09-21 | End: 2021-09-28

## 2021-09-21 NOTE — TELEPHONE ENCOUNTER
----- Message from SHONDA Cohen CNP sent at 9/21/2021  8:46 AM EDT -----  Pap smear neg  HPV not detected  AGE 31  Repeat annual exam in 1 year  +BV- flagyl 500mg PO bID X7 days

## 2021-11-30 ENCOUNTER — OFFICE VISIT (OUTPATIENT)
Dept: OBGYN CLINIC | Age: 31
End: 2021-11-30
Payer: COMMERCIAL

## 2021-11-30 VITALS
SYSTOLIC BLOOD PRESSURE: 108 MMHG | DIASTOLIC BLOOD PRESSURE: 68 MMHG | BODY MASS INDEX: 26.99 KG/M2 | HEIGHT: 65 IN | WEIGHT: 162 LBS

## 2021-11-30 DIAGNOSIS — N76.0 ACUTE VAGINITIS: Primary | ICD-10-CM

## 2021-11-30 DIAGNOSIS — N94.6 DYSMENORRHEA: ICD-10-CM

## 2021-11-30 DIAGNOSIS — Z30.09 FAMILY PLANNING: Primary | ICD-10-CM

## 2021-11-30 LAB
CONTROL: NORMAL
PREGNANCY TEST URINE, POC: NEGATIVE

## 2021-11-30 PROCEDURE — 99213 OFFICE O/P EST LOW 20 MIN: CPT | Performed by: NURSE PRACTITIONER

## 2021-11-30 PROCEDURE — 81025 URINE PREGNANCY TEST: CPT | Performed by: NURSE PRACTITIONER

## 2021-11-30 RX ORDER — CLINDAMYCIN HYDROCHLORIDE 300 MG/1
300 CAPSULE ORAL 2 TIMES DAILY
Qty: 14 CAPSULE | Refills: 0 | Status: SHIPPED | OUTPATIENT
Start: 2021-11-30 | End: 2021-12-07

## 2021-11-30 RX ORDER — NORETHINDRONE ACETATE AND ETHINYL ESTRADIOL 1MG-20(21)
1 KIT ORAL DAILY
Qty: 1 PACKET | Refills: 6 | Status: SHIPPED | OUTPATIENT
Start: 2021-11-30 | End: 2022-07-27

## 2021-11-30 RX ORDER — FLUCONAZOLE 150 MG/1
150 TABLET ORAL ONCE
Qty: 2 TABLET | Refills: 2 | Status: SHIPPED | OUTPATIENT
Start: 2021-11-30 | End: 2021-11-30

## 2021-11-30 NOTE — PROGRESS NOTES
aMdina Ibarra  2021    YOB: 1990          The patient was seen today. She is here regarding birth control options. Considering MIrena again. Had removed in March. Was not sexuallya ctive and wanted to give her body a break from hormones. Denies smoking, hypertension, personal or family hx of blood clots. Currently on aldactone for acne. Periods occur every month. Decides on OCP for now. Complaining of vaginal discharge with slight odor. Thinks she has BV infection. Declines cultures Denies new partner. Her bowels are regular and she is voiding without difficulty. HPI:  Madina Ibarra is a 28 y.o. female Meridith Galeazzi     Discuss birth control options. Vaginal odor/ discharge- thinks she has BV infection      OB History    Para Term  AB Living   1 1 1 0 0 1   SAB IAB Ectopic Molar Multiple Live Births   0 0 0 0 0 1      # Outcome Date GA Lbr Sal/2nd Weight Sex Delivery Anes PTL Lv   1 Term 11 40w0d  7 lb 4 oz (3.289 kg) M Vag-Spont   MARKELL      Name: Tiesha Caty: 10  Apgar5: 9       Past Medical History:   Diagnosis Date    Asthma     Attention deficit disorder (ADD)     Depression     LSIL (low grade squamous intraepithelial lesion) on Pap smear 12    LSIL (low grade squamous intraepithelial lesion) on Pap smear        History reviewed. No pertinent surgical history.     Family History   Problem Relation Age of Onset    Diabetes Father     Cirrhosis Father     Heart Disease Mother         tachycardia    Depression Mother     Anemia Mother     Other Mother         bulemia       Social History     Socioeconomic History    Marital status: Single     Spouse name: Not on file    Number of children: Not on file    Years of education: Not on file    Highest education level: Not on file   Occupational History    Not on file   Tobacco Use    Smoking status: Never Smoker    Smokeless tobacco: Never Used   Substance and Sexual Activity    Alcohol use: No    Drug use: No    Sexual activity: Yes     Partners: Male   Other Topics Concern    Not on file   Social History Narrative    Not on file     Social Determinants of Health     Financial Resource Strain:     Difficulty of Paying Living Expenses: Not on file   Food Insecurity:     Worried About Running Out of Food in the Last Year: Not on file    [place] of Food in the Last Year: Not on file   Transportation Needs:     Lack of Transportation (Medical): Not on file    Lack of Transportation (Non-Medical): Not on file   Physical Activity:     Days of Exercise per Week: Not on file    Minutes of Exercise per Session: Not on file   Stress:     Feeling of Stress : Not on file   Social Connections:     Frequency of Communication with Friends and Family: Not on file    Frequency of Social Gatherings with Friends and Family: Not on file    Attends Mu-ism Services: Not on file    Active Member of [address] [place] [address] or Organizations: Not on file    Attends Club or Organization Meetings: Not on file    Marital Status: Not on file   Intimate Partner Violence:     Fear of Current or Ex-Partner: Not on file    Emotionally Abused: Not on file    Physically Abused: Not on file    Sexually Abused: Not on file   Housing Stability:     Unable to Pay for Housing in the Last Year: Not on file    Number of [place] in the Last Year: Not on file    Unstable Housing in the Last Year: Not on file         MEDICATIONS:  Current Outpatient Medications   Medication Sig Dispense Refill    clindamycin (CLEOCIN) 300 MG capsule Take 1 capsule by mouth 2 times daily for 7 days 14 capsule 0    fluconazole (DIFLUCAN) 150 MG tablet Take 1 tablet by mouth once for 1 dose May repeat dose in 7 days.  2 tablet 2    norethindrone-ethinyl estradiol (LOESTRIN FE 1/20) 1-20 MG-MCG per tablet Take 1 tablet by mouth daily for 28 days 1 packet 6    spironolactone (ALDACTONE) 50 MG tablet Take one tablet PO daily 30 tablet 5     No current facility-administered medications for this visit. ALLERGIES:  Allergies as of 11/30/2021 - Fully Reviewed 11/30/2021   Allergen Reaction Noted    Neosporin [bacitracin-neomycin-polymyxin] Rash 08/22/2011         REVIEW OF SYSTEMS:    yes   A minimum of an eleven point review of systems was completed. Review Of Systems (11 point):  Constitutional: No fever, chills or malaise; No weight change or fatigue  Head and Eyes: No vision, Headache, Dizziness or trauma in last 12 months  ENT ROS: No hearing, Tinnitis, sinus or taste problems  Hematological and Lymphatic ROS:No Lymphoma, Von Willebrand's, Hemophillia or Bleeding History  Psych ROS: No Depression, Homicidal thoughts,suicidal thoughts, or anxiety  Breast ROS: No prior breast abnormalities or lumps  Respiratory ROS: No SOB, Pneumoniae,Cough, or Pulmonary Embolism History  Cardiovascular ROS: No Chest Pain with Exertion, Palpitations, Syncope, Edema, Arrhythmia  Gastrointestinal ROS: No Indigestion, Heartburn, Nausea, vomiting, Diarrhea, Constipation,or Bowel Changes; No Bloody Stools or melena  Genito-Urinary ROS: No Dysuria, Hematuria or Nocturia. No Urinary Incontinence + Vaginal Discharge/odor. Musculoskeletal ROS: No Arthralgia, Arthritis,Gout,Osteoporosis or Rheumatism  Neurological ROS: No CVA, Migraines, Epilepsy, Seizure Hx, or Limb Weakness  Dermatological ROS: No Rash, Itching, Hives, Mole Changes or Cancer          Blood pressure 108/68, height 5' 5\" (1.651 m), weight 162 lb (73.5 kg), last menstrual period 11/16/2021, not currently breastfeeding. Abdomen: Soft non-tender; good bowel sounds. No guarding, rebound or rigidity. No CVA tenderness bilaterally. Extremities: No calf tenderness, DTR 2/4, and No edema bilaterally    Pelvic: Exam deferred. Diagnostics:  No results found.     Lab Results:  Results for orders placed or performed in visit on 11/30/21   POCT urine pregnancy   Result Value Ref Range    Preg Test, Ur negative     Control done          Assessment:   Diagnosis Orders   1. Acute vaginitis  clindamycin (CLEOCIN) 300 MG capsule    fluconazole (DIFLUCAN) 150 MG tablet   2. Dysmenorrhea  POCT urine pregnancy     Patient Active Problem List    Diagnosis Date Noted    LGSIL of cervix of undetermined significance     Asthma     Depression     Attention deficit disorder (ADD)        Counseling Hormonal Based Birth Control:      The patient was seen and counseled on all forms of birth control both male and female  reversible and non. She is aware that hormonal based birth control may increase her risk of developing a blood clot which may increase her morbidity and or mortality. She was counseled on alternate non hormonal based contraception options. We discussed that smoking and any hormonal based contraception may increase the patients risks of developing these life threatening blood clots. All patients are encouraged to stop smoking at the time of contraceptive counseling. Cessation programs were reviewed. The patient was instructed to use barrier contraception for sexually transmitted disease prevention. The patient was also informed of antibiotics decreasing contraceptive efficacy and the need for barrier contraception from the onset of her antibiotic dosing and through a minimum of thirty days from antibiotic cessation. The life threatening side effect profile was reviewed in detail this includes but is not limited to shortness of breath, chest pain, severe or persistent headaches, or calf pain. If any of these occur the patient has been instructed to stop using her hormonal based contraception, notify the office, and go to the emergency department or call 911. The patient denied any personal history of blood clots in her leg, lung, or heart and denied any family history of stroke, TIA, sudden cardiac death < 36 y.o.,pulmonary embolism, or deep venous thrombosis.     PLAN:  Return if symptoms worsen or fail to improve. Declines pelvic exam, vaginal cultures. Requesting treatment for BV. Prescriptions sent. Will return if no resolution of symptoms. Decides on oral contraception. Will return if patient decides on mirena for vaginal cultures. Urine pregnancy test negative. Repeat Annual every 1 year  Cervical Cytology Evaluation begins at 24years old. If Negative Cytology, Follow-up screening per current guidelines. Return to the office in PRN weeks. Counseled on preventative health maintenance follow-up. Orders Placed This Encounter   Procedures    POCT urine pregnancy           The patient, Josh Kohli is a 28 y.o. female, was seen with a total time spent of 20 minutes for the visit on this date of service by the E/M provider. The time component had both face to face and non face to face time spent in determining the total time component. Counseling and education regarding her diagnosis listed below and her options regarding those diagnoses were also included in determining her time component. Diagnosis Orders   1. Acute vaginitis  clindamycin (CLEOCIN) 300 MG capsule    fluconazole (DIFLUCAN) 150 MG tablet   2. Dysmenorrhea  POCT urine pregnancy        The patient had her preventative health maintenance recommendations and follow-up reviewed with her at the completion of her visit.

## 2021-12-07 ENCOUNTER — TELEPHONE (OUTPATIENT)
Dept: OBGYN CLINIC | Age: 31
End: 2021-12-07

## 2021-12-07 NOTE — TELEPHONE ENCOUNTER
Pt has appt scheduled on 12/10 to f/u re: cyst, but has some additional concerns & would like to speak w/ nurse beforehand.     [phone]

## 2021-12-07 NOTE — TELEPHONE ENCOUNTER
Pt called in with pelvic pain, feeling bloated/swollen in abdomen, and feeling as she is going to pass out. Was seen in clinic in [address] [place] [address] due to passing out prior to boarding plane. Reports an ovarian cyst and uterus walls inflamed. Per Lucia Shin NP, pt to keep her appt for Friday, get records from [address] [place] [address], and go to ER if she's feeling like passing out. Pt verbalized understanding.

## 2021-12-10 ENCOUNTER — OFFICE VISIT (OUTPATIENT)
Dept: OBGYN CLINIC | Age: 31
End: 2021-12-10
Payer: COMMERCIAL

## 2021-12-10 VITALS
SYSTOLIC BLOOD PRESSURE: 120 MMHG | WEIGHT: 163 LBS | DIASTOLIC BLOOD PRESSURE: 82 MMHG | BODY MASS INDEX: 27.16 KG/M2 | HEIGHT: 65 IN

## 2021-12-10 DIAGNOSIS — N83.201 RIGHT OVARIAN CYST: ICD-10-CM

## 2021-12-10 DIAGNOSIS — N83.202 LEFT OVARIAN CYST: Primary | ICD-10-CM

## 2021-12-10 PROCEDURE — 99213 OFFICE O/P EST LOW 20 MIN: CPT | Performed by: NURSE PRACTITIONER

## 2021-12-10 RX ORDER — METRONIDAZOLE 500 MG/1
TABLET ORAL
COMMUNITY
Start: 2021-12-06 | End: 2022-03-04

## 2021-12-10 NOTE — PROGRESS NOTES
Vonnie Simpson  12/10/2021    YOB: 1990          The patient was seen today. She is here regarding follow up on bilateral ovarian cysts. Was in [place], on , ready to come home on a plane and had severe pelvic pain, passed out. Was then taken to urgent care/ER in [place]. Diagnosed with ruptured ovarian cysts. Records reviewed. Left ovarian cyst 10cm, right ovary 6cm. Patient reports pain is much better. Experiencing dull ache. Has occasional nausea, but denies fever. Patient was previously started on OCPs but waiting till next menstrual cycle to start first pack. Her bowels are regular and she is voiding without difficulty. HPI:  Vonnie Simpson is a 28 y.o. female      Bilateral ovarian cysts      OB History    Para Term  AB Living   1 1 1 0 0 1   SAB IAB Ectopic Molar Multiple Live Births   0 0 0 0 0 1      # Outcome Date GA Lbr Sal/2nd Weight Sex Delivery Anes PTL Lv   1 Term 11 40w0d  7 lb 4 oz (3.289 kg) M Vag-Spont   MARKELL      Name: Britta Lazo: 9  Apgar5: 9       Past Medical History:   Diagnosis Date    Asthma     Attention deficit disorder (ADD)     Depression     LSIL (low grade squamous intraepithelial lesion) on Pap smear 12    LSIL (low grade squamous intraepithelial lesion) on Pap smear        History reviewed. No pertinent surgical history.     Family History   Problem Relation Age of Onset    Diabetes Father     Cirrhosis Father     Heart Disease Mother         tachycardia    Depression Mother     Anemia Mother     Other Mother         bulemia       Social History     Socioeconomic History    Marital status: Single     Spouse name: Not on file    Number of children: Not on file    Years of education: Not on file    Highest education level: Not on file   Occupational History    Not on file   Tobacco Use    Smoking status: Never Smoker    Smokeless tobacco: Never Used   Substance and Sexual Activity    Alcohol use: No    Drug use: No    Sexual activity: Yes     Partners: Male   Other Topics Concern    Not on file   Social History Narrative    Not on file     Social Determinants of Health     Financial Resource Strain:     Difficulty of Paying Living Expenses: Not on file   Food Insecurity:     Worried About Running Out of Food in the Last Year: Not on file    [place] of Food in the Last Year: Not on file   Transportation Needs:     Lack of Transportation (Medical): Not on file    Lack of Transportation (Non-Medical): Not on file   Physical Activity:     Days of Exercise per Week: Not on file    Minutes of Exercise per Session: Not on file   Stress:     Feeling of Stress : Not on file   Social Connections:     Frequency of Communication with Friends and Family: Not on file    Frequency of Social Gatherings with Friends and Family: Not on file    Attends Pentecostal Services: Not on file    Active Member of [address] [place] [address] or Organizations: Not on file    Attends Club or Organization Meetings: Not on file    Marital Status: Not on file   Intimate Partner Violence:     Fear of Current or Ex-Partner: Not on file    Emotionally Abused: Not on file    Physically Abused: Not on file    Sexually Abused: Not on file   Housing Stability:     Unable to Pay for Housing in the Last Year: Not on file    Number of [place] in the Last Year: Not on file    Unstable Housing in the Last Year: Not on file         MEDICATIONS:  Current Outpatient Medications   Medication Sig Dispense Refill    metroNIDAZOLE (FLAGYL) 500 MG tablet       norethindrone-ethinyl estradiol (LOESTRIN FE 1/20) 1-20 MG-MCG per tablet Take 1 tablet by mouth daily for 28 days 1 packet 6    spironolactone (ALDACTONE) 50 MG tablet Take one tablet PO daily 30 tablet 5     No current facility-administered medications for this visit.              ALLERGIES:  Allergies as of 12/10/2021 - Fully Reviewed 12/10/2021   Allergen Reaction Noted    Neosporin [bacitracin-neomycin-polymyxin] Rash 08/22/2011         REVIEW OF SYSTEMS:    yes   A minimum of an eleven point review of systems was completed. Review Of Systems (11 point):  Constitutional: No fever, chills or malaise; No weight change or fatigue  Head and Eyes: No vision, Headache, Dizziness or trauma in last 12 months  ENT ROS: No hearing, Tinnitis, sinus or taste problems  Hematological and Lymphatic ROS:No Lymphoma, Von Willebrand's, Hemophillia or Bleeding History  Psych ROS: No Depression, Homicidal thoughts,suicidal thoughts, or anxiety  Breast ROS: No prior breast abnormalities or lumps  Respiratory ROS: No SOB, Pneumoniae,Cough, or Pulmonary Embolism History  Cardiovascular ROS: No Chest Pain with Exertion, Palpitations, Syncope, Edema, Arrhythmia  Gastrointestinal ROS: No Indigestion, Heartburn, Nausea, vomiting, Diarrhea, Constipation,or Bowel Changes; No Bloody Stools or melena  Genito-Urinary ROS: No Dysuria, Hematuria or Nocturia. No Urinary Incontinence or Vaginal Discharge  Musculoskeletal ROS: No Arthralgia, Arthritis,Gout,Osteoporosis or Rheumatism  Neurological ROS: No CVA, Migraines, Epilepsy, Seizure Hx, or Limb Weakness  Dermatological ROS: No Rash, Itching, Hives, Mole Changes or Cancer          Blood pressure 120/82, height 5' 5\" (1.651 m), weight 163 lb (73.9 kg), last menstrual period 11/16/2021, not currently breastfeeding. Abdomen: Soft non-tender; good bowel sounds. No guarding, rebound or rigidity. No CVA tenderness bilaterally. Extremities: No calf tenderness, DTR 2/4, and No edema bilaterally    Pelvic: Exam deferred. Diagnostics:  No results found. Lab Results:  Results for orders placed or performed in visit on 11/30/21   POCT urine pregnancy   Result Value Ref Range    Preg Test, Ur negative     Control done          Assessment:   Diagnosis Orders   1. Left ovarian cyst  US PELVIS COMPLETE    US NON OB TRANSVAGINAL   2.  Right ovarian cyst Patient Active Problem List    Diagnosis Date Noted    LGSIL of cervix of undetermined significance     Asthma     Depression     Attention deficit disorder (ADD)            PLAN:  Return if symptoms worsen or fail to improve. Repeat pelvic ultrasound in 2-3 weeks. Torsion restrictions. Call office or go to ER with worsening symptoms. Note given to excuse from work this week through weekend- per request.  Repeat Annual every 1 year  Cervical Cytology Evaluation begins at 24years old. If Negative Cytology, Follow-up screening per current guidelines. Return to the office in PRN weeks. Counseled on preventative health maintenance follow-up. Orders Placed This Encounter   Procedures    US PELVIS COMPLETE     Standing Status:   Future     Standing Expiration Date:   3/10/2022     Order Specific Question:   Reason for exam:     Answer:   left ovarian cyst    US NON OB TRANSVAGINAL     Standing Status:   Future     Standing Expiration Date:   6/10/2022     Order Specific Question:   Reason for exam:     Answer:   left ovarian cyst           The patient, Perico Soto is a 28 y.o. female, was seen with a total time spent of 20 minutes for the visit on this date of service by the E/M provider. The time component had both face to face and non face to face time spent in determining the total time component. Counseling and education regarding her diagnosis listed below and her options regarding those diagnoses were also included in determining her time component. Diagnosis Orders   1. Left ovarian cyst  US PELVIS COMPLETE    US NON OB TRANSVAGINAL   2. Right ovarian cyst          The patient had her preventative health maintenance recommendations and follow-up reviewed with her at the completion of her visit.

## 2022-02-01 ENCOUNTER — TELEPHONE (OUTPATIENT)
Dept: OBGYN CLINIC | Age: 32
End: 2022-02-01

## 2022-02-01 ENCOUNTER — HOSPITAL ENCOUNTER (OUTPATIENT)
Age: 32
Setting detail: SPECIMEN
Discharge: HOME | End: 2022-02-01
Payer: COMMERCIAL

## 2022-02-01 DIAGNOSIS — N93.9 VAGINAL BLEEDING: ICD-10-CM

## 2022-02-01 DIAGNOSIS — N93.9 VAGINAL BLEEDING: Primary | ICD-10-CM

## 2022-02-01 LAB — HCG QUANTITATIVE: <1 IU/L

## 2022-02-01 PROCEDURE — 36415 COLL VENOUS BLD VENIPUNCTURE: CPT

## 2022-02-01 PROCEDURE — 84702 CHORIONIC GONADOTROPIN TEST: CPT

## 2022-02-01 NOTE — TELEPHONE ENCOUNTER
Pt has questions/concerns re: vag bleeding    Understands it takes 3 months to regulate with birth control but concerned with vag clotting    Please call to discuss [phone]

## 2022-02-01 NOTE — TELEPHONE ENCOUNTER
Pt stating she is having irreg vaginal bleeding with clotting and is requesting quant to make sure she did not miscarry. Order in epic. Pt to call office for results and recommendations. Pt with birthcontrol concerns but we will address concerns after quant results.

## 2022-02-25 ENCOUNTER — TELEMEDICINE (OUTPATIENT)
Dept: DERMATOLOGY | Age: 32
End: 2022-02-25
Payer: COMMERCIAL

## 2022-02-25 DIAGNOSIS — L70.0 ACNE VULGARIS: Primary | ICD-10-CM

## 2022-02-25 PROCEDURE — 99214 OFFICE O/P EST MOD 30 MIN: CPT | Performed by: DERMATOLOGY

## 2022-02-25 RX ORDER — SPIRONOLACTONE 50 MG/1
TABLET, FILM COATED ORAL
Qty: 60 TABLET | Refills: 5 | Status: SHIPPED | OUTPATIENT
Start: 2022-02-25 | End: 2022-08-26 | Stop reason: SDUPTHER

## 2022-02-25 NOTE — PROGRESS NOTES
TELEHEALTH EVALUATION -- Audio/Visual (During HSTTM-15 public health emergency)    Dermatology Patient Note  [address]  [facility]  Dept: [phone]  Dept Fax: [phone]      VISITDATE: 2/25/2022   REFERRING PROVIDER: No ref. provider found      Raquel Salinas is a 28 y.o. female  who presents today in the office for:    No chief complaint on file. PERTINENT HISTORY NOT LISTED ABOVE:  Patient presents for acne f/u  Finished isotretinoin year ago  Continues spironolactone 50 mg daily, mostly clear aside from a few outbreaks on her jaw line around her period  Does not use tretinoin cream  She is [facility]    CURRENT MEDICATIONS:   Current Outpatient Medications   Medication Sig Dispense Refill    spironolactone (ALDACTONE) 50 MG tablet Take 1.5 tablets PO daily x 7 days, then increase to 2 tablets PO daily if tolerating 60 tablet 5    tretinoin (RETIN-A) 0.025 % cream Apply pea sized amount to face nightly 45 g 5    metroNIDAZOLE (FLAGYL) 500 MG tablet       norethindrone-ethinyl estradiol (LOESTRIN FE 1/20) 1-20 MG-MCG per tablet Take 1 tablet by mouth daily for 28 days 1 packet 6     No current facility-administered medications for this visit. ALLERGIES:   Allergies   Allergen Reactions    Neosporin [Bacitracin-Neomycin-Polymyxin] Rash       SOCIAL HISTORY:  Social History     Tobacco Use    Smoking status: Never Smoker    Smokeless tobacco: Never Used   Substance Use Topics    Alcohol use: No       Pertinent ROS:  Review of Systems  Skin: Denies any new changing, growing or bleeding lesions or rashes except as described in the HPI   Constitutional: Denies fevers, chills, and malaise.     PHYSICAL EXAM:     Due to this being a TeleHealth encounter, evaluation of the following organ systems is limited: Vitals/Constitutional/EENT/Resp/CV/GI//MS/Neuro/Skin/Heme-Lymph-Imm. In particular examination of the skin is limited by video quality and patient available technology. There were no vitals taken for this visit. The patient is generally well appearing, well nourished, alert and conversational. Affect is normal.    Cutaneous Exam:  Physical Exam  Face and neck only were examined    Diagnoses/exam findings/medical history pertinent to this visit are listed below:    Assessment and Plan:  Assessment   1. Acne vulgaris  - chronic illness, responding to treatment but not yet at goal  - increase dose of spironolactone, patient denies SE  - add back tretinoin cream  - will look forward to results of laser treatments  - spironolactone (ALDACTONE) 50 MG tablet; Take 1.5 tablets PO daily x 7 days, then increase to 2 tablets PO daily if tolerating  Dispense: 60 tablet; Refill: 5  - tretinoin (RETIN-A) 0.025 % cream; Apply pea sized amount to face nightly  Dispense: 45 g; Refill: 5          RTC 6 months    There are no Patient Instructions on file for this visit. This note was created with the assistance of a speech-recognition program.  Although the intention is to generate a document that actually reflects the content of the visit, no guarantees can be provided that every mistake has been identified and corrected byediting. Pursuant to the emergency declaration under the [facility], [facility] waiver authority and the [employer] and [employer] Act, this Virtual  Visit was conducted, with patient's consent, to reduce the patient's risk of exposure to COVID-19 and provide continuity of care for an established patient. Services were provided through a video synchronous discussion virtually to substitute for in-person clinic visit.      Electronically signed by Sydell Apgar, MD on 22 at 8:09 AM EST

## 2022-03-04 ENCOUNTER — TELEPHONE (OUTPATIENT)
Dept: OBGYN CLINIC | Age: 32
End: 2022-03-04

## 2022-03-04 ENCOUNTER — OFFICE VISIT (OUTPATIENT)
Dept: OBGYN CLINIC | Age: 32
End: 2022-03-04
Payer: COMMERCIAL

## 2022-03-04 VITALS
HEIGHT: 65 IN | WEIGHT: 158 LBS | BODY MASS INDEX: 26.33 KG/M2 | SYSTOLIC BLOOD PRESSURE: 118 MMHG | DIASTOLIC BLOOD PRESSURE: 78 MMHG

## 2022-03-04 DIAGNOSIS — N76.0 ACUTE VAGINITIS: Primary | ICD-10-CM

## 2022-03-04 PROCEDURE — 99213 OFFICE O/P EST LOW 20 MIN: CPT | Performed by: NURSE PRACTITIONER

## 2022-03-04 RX ORDER — METRONIDAZOLE 7.5 MG/G
GEL VAGINAL
Qty: 1 EACH | Refills: 3 | Status: SHIPPED | OUTPATIENT
Start: 2022-03-04 | End: 2022-03-11

## 2022-03-04 RX ORDER — CLINDAMYCIN HYDROCHLORIDE 300 MG/1
300 CAPSULE ORAL 2 TIMES DAILY
Qty: 14 CAPSULE | Refills: 0 | Status: SHIPPED | OUTPATIENT
Start: 2022-03-04 | End: 2022-06-21 | Stop reason: SDUPTHER

## 2022-03-04 NOTE — PROGRESS NOTES
Juanito Matthews  3/4/2022    YOB: 1990          The patient was seen today. She is here regarding vaginal discharge. C/o recurrent BV infections. Maintaining personal hygiene. Taking a vaginal probiotic . Her bowels are regular and she is voiding without difficulty. HPI:  Juanito Matthews is a 28 y.o. female  vaginal discharge       OB History    Para Term  AB Living   1 1 1 0 0 1   SAB IAB Ectopic Molar Multiple Live Births   0 0 0 0 0 1      # Outcome Date GA Lbr Sal/2nd Weight Sex Delivery Anes PTL Lv   1 Term 11 40w0d  7 lb 4 oz (3.289 kg) M Vag-Spont   MARKELL      Name: [facility]: 9  Apgar5: 9       Past Medical History:   Diagnosis Date    Asthma     Attention deficit disorder (ADD)     Depression     LSIL (low grade squamous intraepithelial lesion) on Pap smear 12    LSIL (low grade squamous intraepithelial lesion) on Pap smear        No past surgical history on file.     Family History   Problem Relation Age of Onset    Diabetes Father     Cirrhosis Father     Heart Disease Mother         tachycardia    Depression Mother     Anemia Mother     Other Mother         bulemia       Social History     Socioeconomic History    Marital status: Single     Spouse name: Not on file    Number of children: Not on file    Years of education: Not on file    Highest education level: Not on file   Occupational History    Not on file   Tobacco Use    Smoking status: Never Smoker    Smokeless tobacco: Never Used   Substance and Sexual Activity    Alcohol use: No    Drug use: No    Sexual activity: Yes     Partners: Male   Other Topics Concern    Not on file   Social History Narrative    Not on file     Social Determinants of Health     Financial Resource Strain:     Difficulty of Paying Living Expenses: Not on file   Food Insecurity:     Worried About Running Out of Food in the Last Year: Not on file    [address] [facility] [address] in the Last Year: Not on file   Transportation Needs:     Lack of Transportation (Medical): Not on file    Lack of Transportation (Non-Medical): Not on file   Physical Activity:     Days of Exercise per Week: Not on file    Minutes of Exercise per Session: Not on file   Stress:     Feeling of Stress : Not on file   Social Connections:     Frequency of Communication with Friends and Family: Not on file    Frequency of Social Gatherings with Friends and Family: Not on file    Attends Gnosticist Services: Not on file    Active Member of [address] [place] [address] [employer] or Organizations: Not on file    Attends Club or Organization Meetings: Not on file    Marital Status: Not on file   Intimate Partner Violence:     Fear of Current or Ex-Partner: Not on file    Emotionally Abused: Not on file    Physically Abused: Not on file    Sexually Abused: Not on file   Housing Stability:     Unable to Pay for Housing in the Last Year: Not on file    Number of [place] in the Last Year: Not on file    Unstable Housing in the Last Year: Not on file         MEDICATIONS:  Current Outpatient Medications   Medication Sig Dispense Refill    clindamycin (CLEOCIN) 300 MG capsule Take 1 capsule by mouth 2 times daily for 7 days 14 capsule 0    metroNIDAZOLE (METROGEL VAGINAL) 0.75 % vaginal gel One applicator intravaginally twice weekly for 4-6 months for suppression of BV 1 each 3    spironolactone (ALDACTONE) 50 MG tablet Take 1.5 tablets PO daily x 7 days, then increase to 2 tablets PO daily if tolerating 60 tablet 5    tretinoin (RETIN-A) 0.025 % cream Apply pea sized amount to face nightly 45 g 5    norethindrone-ethinyl estradiol (LOESTRIN FE 1/20) 1-20 MG-MCG per tablet Take 1 tablet by mouth daily for 28 days 1 packet 6     No current facility-administered medications for this visit.              ALLERGIES:  Allergies as of 03/04/2022 - Fully Reviewed 03/04/2022   Allergen Reaction Noted    Neosporin [bacitracin-neomycin-polymyxin] Rash 08/22/2011         REVIEW OF SYSTEMS:    yes   A minimum of an eleven point review of systems was completed. Review Of Systems (11 point):  Constitutional: No fever, chills or malaise; No weight change or fatigue  Head and Eyes: No vision, Headache, Dizziness or trauma in last 12 months  ENT ROS: No hearing, Tinnitis, sinus or taste problems  Hematological and Lymphatic ROS:No Lymphoma, Von Willebrand's, Hemophillia or Bleeding History  Psych ROS: No Depression, Homicidal thoughts,suicidal thoughts, or anxiety  Breast ROS: No prior breast abnormalities or lumps  Respiratory ROS: No SOB, Pneumoniae,Cough, or Pulmonary Embolism History  Cardiovascular ROS: No Chest Pain with Exertion, Palpitations, Syncope, Edema, Arrhythmia  Gastrointestinal ROS: No Indigestion, Heartburn, Nausea, vomiting, Diarrhea, Constipation,or Bowel Changes; No Bloody Stools or melena  Genito-Urinary ROS: No Dysuria, Hematuria or Nocturia. No Urinary Incontinence or Vaginal Discharge  Musculoskeletal ROS: No Arthralgia, Arthritis,Gout,Osteoporosis or Rheumatism  Neurological ROS: No CVA, Migraines, Epilepsy, Seizure Hx, or Limb Weakness  Dermatological ROS: No Rash, Itching, Hives, Mole Changes or Cancer          Blood pressure 118/78, height 5' 5\" (1.651 m), weight 158 lb (71.7 kg), last menstrual period 02/17/2022, not currently breastfeeding. Chaperone for Intimate Exam   Chaperone was offered and accepted as part of the rooming process.  Chaperone: Sindi SOLIS         Abdomen: Soft non-tender; good bowel sounds. No guarding, rebound or rigidity. No CVA tenderness bilaterally. Extremities: No calf tenderness, DTR 2/4, and No edema bilaterally    Pelvic: Vulva and vagina appear normal. Bimanual exam reveals normal uterus and adnexa. Diagnostics:  No results found.     Lab Results:  Results for orders placed or performed during the hospital encounter of 02/01/22   HCG, Quantitative, Pregnancy   Result Value Ref Range    hCG Quant <1 <5 IU/L         Assessment:   Diagnosis Orders   1. Acute vaginitis  C.trachomatis N.gonorrhoeae DNA    Vaginitis DNA Probe     Patient Active Problem List    Diagnosis Date Noted    LGSIL of cervix of undetermined significance     Asthma     Depression     Attention deficit disorder (ADD)            PLAN:  Return if symptoms worsen or fail to improve. Vaginal cultures collected  Desire to have cleocin as she plans to drink alcohol this weekend  Desires to try Metrogel for suppression of BV  Repeat Annual every 1 year  Cervical Cytology Evaluation begins at 25years old. If Negative Cytology, Follow-up screening per current guidelines. Return to the office in prn weeks. Counseled on preventative health maintenance follow-up. Orders Placed This Encounter   Procedures    C.trachomatis N.gonorrhoeae DNA     Standing Status:   Future     Standing Expiration Date:   3/3/2023    Vaginitis DNA Probe     Standing Status:   Future     Standing Expiration Date:   3/3/2023           The patient, Tara Rowe is a 28 y.o. female, was seen with a total time spent of 15 minutes for the visit on this date of service by the E/M provider. The time component had both face to face and non face to face time spent in determining the total time component. Counseling and education regarding her diagnosis listed below and her options regarding those diagnoses were also included in determining her time component. Diagnosis Orders   1. Acute vaginitis  C.trachomatis N.gonorrhoeae DNA    Vaginitis DNA Probe        The patient had her preventative health maintenance recommendations and follow-up reviewed with her at the completion of her visit.

## 2022-03-10 DIAGNOSIS — N76.0 ACUTE VAGINITIS: ICD-10-CM

## 2022-06-21 ENCOUNTER — TELEPHONE (OUTPATIENT)
Dept: OBGYN CLINIC | Age: 32
End: 2022-06-21

## 2022-06-21 RX ORDER — CLINDAMYCIN HYDROCHLORIDE 300 MG/1
300 CAPSULE ORAL 2 TIMES DAILY
Qty: 14 CAPSULE | Refills: 0 | Status: SHIPPED | OUTPATIENT
Start: 2022-06-21 | End: 2022-06-28

## 2022-06-27 RX ORDER — CLINDAMYCIN HYDROCHLORIDE 300 MG/1
CAPSULE ORAL
Qty: 14 CAPSULE | Refills: 0 | OUTPATIENT
Start: 2022-06-27

## 2022-07-27 ENCOUNTER — OFFICE VISIT (OUTPATIENT)
Dept: OBGYN CLINIC | Age: 32
End: 2022-07-27
Payer: COMMERCIAL

## 2022-07-27 ENCOUNTER — HOSPITAL ENCOUNTER (OUTPATIENT)
Age: 32
Setting detail: SPECIMEN
Discharge: HOME | End: 2022-07-27
Payer: COMMERCIAL

## 2022-07-27 VITALS
SYSTOLIC BLOOD PRESSURE: 104 MMHG | BODY MASS INDEX: 27.82 KG/M2 | DIASTOLIC BLOOD PRESSURE: 68 MMHG | HEIGHT: 65 IN | WEIGHT: 167 LBS

## 2022-07-27 DIAGNOSIS — Z20.2 STD EXPOSURE: ICD-10-CM

## 2022-07-27 DIAGNOSIS — Z20.2 STD EXPOSURE: Primary | ICD-10-CM

## 2022-07-27 DIAGNOSIS — Z32.02 NEGATIVE PREGNANCY TEST: ICD-10-CM

## 2022-07-27 LAB
CONTROL: NORMAL
HCG QUANTITATIVE: <1 MIU/ML
HEPATITIS B SURFACE ANTIGEN: NONREACTIVE
HEPATITIS C ANTIBODY: NONREACTIVE
HIV AG/AB: NONREACTIVE
PREGNANCY TEST URINE, POC: NEGATIVE
T. PALLIDUM, IGG: NONREACTIVE

## 2022-07-27 PROCEDURE — 87389 HIV-1 AG W/HIV-1&-2 AB AG IA: CPT

## 2022-07-27 PROCEDURE — 86695 HERPES SIMPLEX TYPE 1 TEST: CPT

## 2022-07-27 PROCEDURE — 86694 HERPES SIMPLEX NES ANTBDY: CPT

## 2022-07-27 PROCEDURE — 87340 HEPATITIS B SURFACE AG IA: CPT

## 2022-07-27 PROCEDURE — 84702 CHORIONIC GONADOTROPIN TEST: CPT

## 2022-07-27 PROCEDURE — 86780 TREPONEMA PALLIDUM: CPT

## 2022-07-27 PROCEDURE — 86696 HERPES SIMPLEX TYPE 2 TEST: CPT

## 2022-07-27 PROCEDURE — 81025 URINE PREGNANCY TEST: CPT | Performed by: NURSE PRACTITIONER

## 2022-07-27 PROCEDURE — 36415 COLL VENOUS BLD VENIPUNCTURE: CPT

## 2022-07-27 PROCEDURE — 99213 OFFICE O/P EST LOW 20 MIN: CPT | Performed by: NURSE PRACTITIONER

## 2022-07-27 PROCEDURE — 86803 HEPATITIS C AB TEST: CPT

## 2022-07-27 NOTE — PROGRESS NOTES
Rehan Awad  2022    YOB: 1990          The patient was seen today. She is here regarding STD screening. Desires pregnancy test as she is 5 days late on menses. Broke up with boyfriend and desires screening . Her bowels are regular and she is voiding without difficulty. HPI:  Rehan Awad is a 33 y.o. female  STD screening      OB History    Para Term  AB Living   1 1 1 0 0 1   SAB IAB Ectopic Molar Multiple Live Births   0 0 0 0 0 1      # Outcome Date GA Lbr Sal/2nd Weight Sex Delivery Anes PTL Lv   1 Term 11 40w0d  7 lb 4 oz (3.289 kg) M Vag-Spont   MARKELL      Name: Wilder Oddi: 9  Apgar5: 9       Past Medical History:   Diagnosis Date    Asthma     Attention deficit disorder (ADD)     Depression     LSIL (low grade squamous intraepithelial lesion) on Pap smear 12    LSIL (low grade squamous intraepithelial lesion) on Pap smear        History reviewed. No pertinent surgical history.     Family History   Problem Relation Age of Onset    Diabetes Father     Cirrhosis Father     Heart Disease Mother         tachycardia    Depression Mother     Anemia Mother     Other Mother         bulemia       Social History     Socioeconomic History    Marital status: Single     Spouse name: Not on file    Number of children: Not on file    Years of education: Not on file    Highest education level: Not on file   Occupational History    Not on file   Tobacco Use    Smoking status: Never    Smokeless tobacco: Never   Substance and Sexual Activity    Alcohol use: No    Drug use: No    Sexual activity: Yes     Partners: Male   Other Topics Concern    Not on file   Social History Narrative    Not on file     Social Determinants of Health     Financial Resource Strain: Not on file   Food Insecurity: Not on file   Transportation Needs: Not on file   Physical Activity: Not on file   Stress: Not on file   Social Connections: Not on file   Intimate Partner Violence: Not on file   Housing Stability: Not on file         MEDICATIONS:  Current Outpatient Medications   Medication Sig Dispense Refill    spironolactone (ALDACTONE) 50 MG tablet Take 1.5 tablets PO daily x 7 days, then increase to 2 tablets PO daily if tolerating 60 tablet 5    tretinoin (RETIN-A) 0.025 % cream Apply pea sized amount to face nightly 45 g 5     No current facility-administered medications for this visit. ALLERGIES:  Allergies as of 07/27/2022 - Fully Reviewed 03/04/2022   Allergen Reaction Noted    Neosporin [bacitracin-neomycin-polymyxin] Rash 08/22/2011         REVIEW OF SYSTEMS:    yes   A minimum of an eleven point review of systems was completed. Review Of Systems (11 point):  Constitutional: No fever, chills or malaise; No weight change or fatigue  Head and Eyes: No vision, Headache, Dizziness or trauma in last 12 months  ENT ROS: No hearing, Tinnitis, sinus or taste problems  Hematological and Lymphatic ROS:No Lymphoma, Von Willebrand's, Hemophillia or Bleeding History  Psych ROS: No Depression, Homicidal thoughts,suicidal thoughts, or anxiety  Breast ROS: No prior breast abnormalities or lumps  Respiratory ROS: No SOB, Pneumoniae,Cough, or Pulmonary Embolism History  Cardiovascular ROS: No Chest Pain with Exertion, Palpitations, Syncope, Edema, Arrhythmia  Gastrointestinal ROS: No Indigestion, Heartburn, Nausea, vomiting, Diarrhea, Constipation,or Bowel Changes; No Bloody Stools or melena  Genito-Urinary ROS: No Dysuria, Hematuria or Nocturia. No Urinary Incontinence or Vaginal Discharge  Musculoskeletal ROS: No Arthralgia, Arthritis,Gout,Osteoporosis or Rheumatism  Neurological ROS: No CVA, Migraines, Epilepsy, Seizure Hx, or Limb Weakness  Dermatological ROS: No Rash, Itching, Hives, Mole Changes or Cancer          Blood pressure 104/68, height 5' 5\" (1.651 m), weight 167 lb (75.8 kg), last menstrual period 06/23/2022, not currently breastfeeding.     Chaperone for Intimate Exam  Chaperone was offered and accepted as part of the rooming process. Chaperone: GIANCARLO SOLIS         Abdomen: Soft non-tender; good bowel sounds. No guarding, rebound or rigidity. No CVA tenderness bilaterally. Extremities: No calf tenderness, DTR 2/4, and No edema bilaterally    Pelvic: Vulva and vagina appear normal. Bimanual exam reveals normal uterus and adnexa. Diagnostics:  No results found. Lab Results:  Results for orders placed or performed during the hospital encounter of 02/01/22   HCG, Quantitative, Pregnancy   Result Value Ref Range    hCG Quant <1 <5 IU/L         Assessment:   Diagnosis Orders   1. STD exposure  C.trachomatis N.gonorrhoeae DNA    Vaginitis DNA Probe    HCG, Quantitative, Pregnancy    Hepatitis B Surface Antigen    Hepatitis C Antibody    HERPES PROFILE    HIV Screen    T. pallidum Ab      2. Negative pregnancy test  POCT urine pregnancy        Patient Active Problem List    Diagnosis Date Noted    LGSIL of cervix of undetermined significance     Asthma     Depression     Attention deficit disorder (ADD)            PLAN:  Return in about 2 months (around 9/27/2022) for annual.  Vaginal cultures collected  Lab slip given  UPT neg  Repeat Annual every 1 year  Cervical Cytology Evaluation begins at 24years old. If Negative Cytology, Follow-up screening per current guidelines. Return to the office in 8 weeks. Counseled on preventative health maintenance follow-up.   Orders Placed This Encounter   Procedures    C.trachomatis N.gonorrhoeae DNA     Standing Status:   Future     Standing Expiration Date:   1/26/2023    Vaginitis DNA Probe     Standing Status:   Future     Standing Expiration Date:   1/26/2023    HCG, Quantitative, Pregnancy     Standing Status:   Future     Standing Expiration Date:   7/27/2023    Hepatitis B Surface Antigen     Standing Status:   Future     Standing Expiration Date:   7/27/2023    Hepatitis C Antibody     Standing Status:   Future Standing Expiration Date:   7/27/2023    HERPES PROFILE     Standing Status:   Future     Standing Expiration Date:   8/26/2022    HIV Screen     Standing Status:   Future     Standing Expiration Date:   7/27/2023    T. pallidum Ab     Standing Status:   Future     Standing Expiration Date:   8/26/2022    POCT urine pregnancy           The patient, Rehan Awad is a 33 y.o. female, was seen with a total time spent of 20 minutes for the visit on this date of service by the E/M provider. The time component had both face to face and non face to face time spent in determining the total time component. Counseling and education regarding her diagnosis listed below and her options regarding those diagnoses were also included in determining her time component. Diagnosis Orders   1. STD exposure  C.trachomatis N.gonorrhoeae DNA    Vaginitis DNA Probe    HCG, Quantitative, Pregnancy    Hepatitis B Surface Antigen    Hepatitis C Antibody    HERPES PROFILE    HIV Screen    T. pallidum Ab      2. Negative pregnancy test  POCT urine pregnancy           The patient had her preventative health maintenance recommendations and follow-up reviewed with her at the completion of her visit.

## 2022-07-28 LAB
HERPES SIMPLEX VIRUS 1 IGG: 0.26
HERPES SIMPLEX VIRUS 2 IGG: 0.07
HERPES TYPE 1/2 IGM COMBINED: 0.52

## 2022-07-29 DIAGNOSIS — Z20.2 STD EXPOSURE: ICD-10-CM

## 2022-08-26 ENCOUNTER — TELEMEDICINE (OUTPATIENT)
Dept: DERMATOLOGY | Age: 32
End: 2022-08-26
Payer: COMMERCIAL

## 2022-08-26 DIAGNOSIS — L70.0 ACNE VULGARIS: ICD-10-CM

## 2022-08-26 DIAGNOSIS — L91.8 INFLAMED SKIN TAG: Primary | ICD-10-CM

## 2022-08-26 PROCEDURE — 99213 OFFICE O/P EST LOW 20 MIN: CPT | Performed by: DERMATOLOGY

## 2022-08-26 RX ORDER — SPIRONOLACTONE 100 MG/1
TABLET, FILM COATED ORAL
Qty: 30 TABLET | Refills: 11 | Status: SHIPPED | OUTPATIENT
Start: 2022-08-26

## 2022-08-26 NOTE — Clinical Note
Schedule for removal of lesion in genital area, possible skin tag (can be 15 min appt, ideally procedure day.  If not procedure day just make it a not double-booked spot)  1 year f/u for acne

## 2022-08-26 NOTE — PROGRESS NOTES
TELEHEALTH EVALUATION -- Audio/Visual (During CIWMJ-99 public health emergency)    Dermatology Patient Note  [address]  [facility]  Dept: [phone]  Dept Fax: [phone]      VISITDATE: 8/26/2022   REFERRING PROVIDER: No ref. provider found      Luis Carlos Ortiz is a 33 y.o. female  who presents today in the office for:    No chief complaint on file. PERTINENT HISTORY NOT LISTED ABOVE:  F/u acne  Previously completed course of accutane  Now on consuelo 100 mg daily increased from 50, doing well, has only had one pimple since LV  Using tretinoin  Getting HALO treatment this winter    Has lesion in genital area, possibly skin tag, snags, wants removed    CURRENT MEDICATIONS:   Current Outpatient Medications   Medication Sig Dispense Refill    spironolactone (ALDACTONE) 100 MG tablet Take one tablet PO daily 30 tablet 11    tretinoin (RETIN-A) 0.025 % cream Apply pea sized amount to face nightly 45 g 11     No current facility-administered medications for this visit. ALLERGIES:   Allergies   Allergen Reactions    Neosporin [Bacitracin-Neomycin-Polymyxin] Rash       SOCIAL HISTORY:  Social History     Tobacco Use    Smoking status: Never    Smokeless tobacco: Never   Substance Use Topics    Alcohol use: No       Pertinent ROS:  Review of Systems  Skin: Denies any new changing, growing or bleeding lesions or rashes except as described in the HPI   Constitutional: Denies fevers, chills, and malaise. PHYSICAL EXAM:     Due to this being a TeleHealth encounter, evaluation of the following organ systems is limited: Vitals/Constitutional/EENT/Resp/CV/GI//MS/Neuro/Skin/Heme-Lymph-Imm. In particular examination of the skin is limited by video quality and patient available technology. There were no vitals taken for this visit.     The patient is generally well appearing, well nourished, alert and conversational. Affect is normal.    Cutaneous Exam:  Physical Exam  Face and neck only were examined    Diagnoses/exam findings/medical history pertinent to this visit are listed below:    Assessment and Plan:  Assessment   1. Acne vulgaris  - stable chronic illness   - denies SE on treatment  - s/p accutane  - spironolactone (ALDACTONE) 100 MG tablet; Take one tablet PO daily  Dispense: 30 tablet; Refill: 11  - tretinoin (RETIN-A) 0.025 % cream; Apply pea sized amount to face nightly  Dispense: 45 g; Refill: 11    2. Inflamed skin tag  Schedule for removal        RTC for skin tag removal, 1 year for acne f/u    There are no Patient Instructions on file for this visit. This note was created with the assistance of a speech-recognition program.  Although the intention is to generate a document that actually reflects the content of the visit, no guarantees can be provided that every mistake has been identified and corrected byediting. Pursuant to the emergency declaration under the [facility], [facility] waiver authority and the [employer] Act, this Virtual  Visit was conducted, with patient's consent, to reduce the patient's risk of exposure to COVID-19 and provide continuity of care for an established patient. Services were provided through a video synchronous discussion virtually to substitute for in-person clinic visit.      Electronically signed by Maggie Lind MD on 8/26/22 at 9:45 AM EDT

## 2022-09-06 ENCOUNTER — PROCEDURE VISIT (OUTPATIENT)
Dept: DERMATOLOGY | Age: 32
End: 2022-09-06
Payer: COMMERCIAL

## 2022-09-06 VITALS
HEIGHT: 65 IN | WEIGHT: 167 LBS | DIASTOLIC BLOOD PRESSURE: 72 MMHG | SYSTOLIC BLOOD PRESSURE: 103 MMHG | BODY MASS INDEX: 27.82 KG/M2 | HEART RATE: 71 BPM | TEMPERATURE: 97.6 F | OXYGEN SATURATION: 97 %

## 2022-09-06 DIAGNOSIS — D22.9 IRRITATED NEVUS: Primary | ICD-10-CM

## 2022-09-06 PROCEDURE — 11300 SHAVE SKIN LESION 0.5 CM/<: CPT | Performed by: DERMATOLOGY

## 2022-09-06 RX ORDER — LIDOCAINE HYDROCHLORIDE 10 MG/ML
5 INJECTION, SOLUTION INFILTRATION; PERINEURAL ONCE
Status: SHIPPED | OUTPATIENT
Start: 2022-09-06

## 2022-09-06 NOTE — PROGRESS NOTES
Dermatology Surgery Pre-Visit Checklist    (Please complete with  Y (yes) / N (no) or explain)    Pathologic Diagnosis: skin tag    Photo available of surgery site in media: n/a    Competent to give informed consent? y   If not, who is authorized to do so: n/a    Need for help for wound care: n   If so, who will do so: n/a    Are you diagnosed:   Diabetes: n   If yes, last A1C: n/a       HIV: n       Hepatitis: n       Current smoker: n  If yes, how much: n/    So you have any of the following: Pacemaker: N       Defibrillator: N       Artificial Heart valve: N                Artificial Joints: N       Other Implantable Device: N       Organ Transplant: N       Other: N/A    Are you on blood thinners such as: Asprin: N       Warfarin/Coumadin: N       Other: N    Any allergies to the following:  Lidocaine: N       Iodine: N       Adhesive: N       Other: NEOSPORIN

## 2022-09-06 NOTE — PROGRESS NOTES
Chief Complaint   Patient presents with    Procedure     Skin tag removal      On exam, this is a inflamed nevus that requires shave removal    Dermatology Procedure Note   [address]  [facility]  Dept: [phone]  Dept Fax: [phone]      Procedure Date: 9/6/2022  Procedure Time: 9:27 AM    Procedure Practitioner: Laura Velazquez MD    Procedure:  Shave removal    Pre-Procedure Diagnosis:  Irritated nevus  (snags clothing, painful)    Post-Procedure Diagnosis: Same as Pre-Procedure Diagnosis    Informed Consent: The procedure and its risks were explained including but not limited to pain, bleeding, infection, permanent scar, permanent pigment alteration and recurrence. Consent to proceed with the procedure was obtained from the patient or the parent by the practitioner    Time Out:  A time out was conducted immediately before starting the procedure that confirmed a final verification of the correct patient, correct procedure, and correct site. Procedure Details:  Shave Removal: The procedure and its risks were explained including but not limited to pain, bleeding, infection, permanent scar, permanent pigment alteration and need for an additional procedure. Consent to proceed with the procedure was obtained from the patient or the parent. After cleaning with alcohol the 5 mm lesion on the right medial thigh was anesthetized with 1% lidocaine with epinephrine and was removed with a dermablade. Hemostasis was achieved with aluminum chloride and Vaseline and a bandage were applied.     Procedure Performed By: Laura Velazquez MD    Estimated Blood Loss: Minimal    Pathologic Specimen: H&E    Procedure Tolerance: Good    Complication(s): None    Electronically signed by Laura Velazquez MD on 9/6/22 at 9:27 AM EDT

## 2022-09-07 ENCOUNTER — TELEPHONE (OUTPATIENT)
Dept: DERMATOLOGY | Age: 32
End: 2022-09-07

## 2022-09-12 DIAGNOSIS — D22.9 IRRITATED NEVUS: ICD-10-CM

## 2022-09-12 NOTE — RESULT ENCOUNTER NOTE
We have received and reviewed your biopsy results which demonstrated a benign skin lesion called a skin tag. No further treatment of this lesion is needed at this time. Is her skin feeling better? I know the wound was hurting and itching but I never saw her for a wound check.

## 2022-10-20 ENCOUNTER — OFFICE VISIT (OUTPATIENT)
Dept: OBGYN CLINIC | Age: 32
End: 2022-10-20
Payer: COMMERCIAL

## 2022-10-20 ENCOUNTER — HOSPITAL ENCOUNTER (OUTPATIENT)
Age: 32
Setting detail: SPECIMEN
Discharge: HOME | End: 2022-10-20

## 2022-10-20 VITALS
BODY MASS INDEX: 28.82 KG/M2 | SYSTOLIC BLOOD PRESSURE: 116 MMHG | WEIGHT: 173 LBS | DIASTOLIC BLOOD PRESSURE: 74 MMHG | HEIGHT: 65 IN

## 2022-10-20 DIAGNOSIS — Z11.51 SPECIAL SCREENING EXAMINATION FOR HUMAN PAPILLOMAVIRUS (HPV): ICD-10-CM

## 2022-10-20 DIAGNOSIS — Z01.419 WELL FEMALE EXAM WITH ROUTINE GYNECOLOGICAL EXAM: Primary | ICD-10-CM

## 2022-10-20 PROCEDURE — 99395 PREV VISIT EST AGE 18-39: CPT | Performed by: NURSE PRACTITIONER

## 2022-10-20 NOTE — PROGRESS NOTES
History and Physical  [address], [address] [facility] [address], [address] [facility] [phone]   Fax [phone]  Frank Cao  10/20/2022              33 y.o. Chief Complaint   Patient presents with    Annual Exam       Patient's last menstrual period was 10/08/2022. Primary Care Physician: No primary care provider on file. The patient was seen and examined. She has no chief complaint today and is here for her annual exam.  Her bowels are regular. There are no voiding complaints. She denies any bloating. She denies vaginal discharge and was counseled on STD's and the need for barrier contraception. HPI : Frank Cao is a 33 y.o. female     Annual  exam  NO CC  No longer taking OCP    no Bloating  no Early Satiety  no Unexplained weight change of more than 15 lbs  no  PMB  no  PCB  ________________________________________________________________________  OB History    Para Term  AB Living   1 1 1 0 0 1   SAB IAB Ectopic Molar Multiple Live Births   0 0 0 0 0 1      # Outcome Date GA Lbr Sal/2nd Weight Sex Delivery Anes PTL Lv   1 Term 11 40w0d  7 lb 4 oz (3.289 kg) M Vag-Spont   MARKELL      Name: Mary Cam: 9  Apgar5: 9     Past Medical History:   Diagnosis Date    Asthma     Attention deficit disorder (ADD)     Depression     LSIL (low grade squamous intraepithelial lesion) on Pap smear 12    LSIL (low grade squamous intraepithelial lesion) on Pap smear                                                                    No past surgical history on file.   Family History   Problem Relation Age of Onset    Diabetes Father     Cirrhosis Father     Heart Disease Mother         tachycardia    Depression Mother     Anemia Mother     Other Mother         bulemia     Social History     Socioeconomic History    Marital status: Single     Spouse name: Not on file    Number of children: Not on file    Years of education: Not on file    Highest education level: Not on file   Occupational History    Not on file   Tobacco Use    Smoking status: Never    Smokeless tobacco: Never   Substance and Sexual Activity    Alcohol use: No    Drug use: No    Sexual activity: Yes     Partners: Male   Other Topics Concern    Not on file   Social History Narrative    Not on file     Social Determinants of Health     Financial Resource Strain: Not on file   Food Insecurity: Not on file   Transportation Needs: Not on file   Physical Activity: Not on file   Stress: Not on file   Social Connections: Not on file   Intimate Partner Violence: Not on file   Housing Stability: Not on file       MEDICATIONS:  Current Outpatient Medications   Medication Sig Dispense Refill    spironolactone (ALDACTONE) 100 MG tablet Take one tablet PO daily 30 tablet 11    tretinoin (RETIN-A) 0.025 % cream Apply pea sized amount to face nightly 45 g 11     Current Facility-Administered Medications   Medication Dose Route Frequency Provider Last Rate Last Admin    lidocaine 1 % injection 5 mg  5 mg IntraDERmal Once Bijan Khanna MD               ALLERGIES:  Allergies as of 10/20/2022 - Fully Reviewed 08/26/2022   Allergen Reaction Noted    Neosporin [bacitracin-neomycin-polymyxin] Rash 08/22/2011       Symptoms of decreased mood absent  Symptoms of anhedonia absent    **If either question is answered in a  positive fashion then complete the PHQ9 Scoring Evaluation and make the appropriate referral**      Immunization status: stated as current, but no records available. Gynecologic History:  Menarche: 16 yo  Menopause at [address] [place] yo     Patient's last menstrual period was 10/08/2022. Sexually Active: No    STD History: No     Permanent Sterilization: No   Reversible Birth Control: No        Hormone Replacement Exposure: No      Genetic Qualified Family History of Breast, Ovarian , Colon or Uterine Cancer: No     If YES see scanned worksheet.     Preventative Health Testing:    Health Maintenance:  Health Maintenance Due   Topic Date Due    COVID-19 Vaccine (1) Never done    Varicella vaccine (1 of 2 - 2-dose childhood series) Never done    Pneumococcal 0-64 years Vaccine (1 - PCV) Never done    Depression Monitoring  Never done    DTaP/Tdap/Td vaccine (1 - Tdap) Never done    Flu vaccine (1) 08/01/2022       Date of Last Pap Smear: 9/2021 neg/neg  Abnormal Pap Smear History: LSIL   Colposcopy History:   Date of Last Mammogram: na  Date of Last Colonoscopy:   Date of Last Bone Density:      ________________________________________________________________________        REVIEW OF SYSTEMS:    see problem list   A minimum of an eleven point review of systems was completed. Review Of Systems (11 point):  Constitutional: No fever, chills or malaise; No weight change or fatigue  Head and Eyes: No vision changes, Headache, Dizziness or trauma in last 12 months  ENT ROS: No hearing, Tinnitis, sinus or taste problems  Hematological and Lymphatic ROS:No Lymphoma, Von Willebrand's, Hemophillia or Bleeding History  Psych ROS: No Depression, Homicidal thoughts,suicidal thoughts, or anxiety  Breast ROS: No breast abnormalities or lumps  Respiratory ROS: No SOB, Pneumoniae,Cough, or Pulmonary Embolism   Cardiovascular ROS: No Chest Pain with Exertion, Palpitations, Syncope, Edema, Arrhythmia  Gastrointestinal ROS: No Indigestion, Heartburn, Nausea, vomiting, Diarrhea, Constipation,or Bowel Changes; No Bloody Stools or melena  Genito-Urinary ROS: No Dysuria, Hematuria or Nocturia.  No Urinary Incontinence or Vaginal Discharge  Musculoskeletal ROS: No Arthralgia, Arthritis,Gout,Osteoporosis or Rheumatism  Neurological ROS: No CVA, Migraines, Epilepsy, Seizure Hx, or Limb Weakness  Dermatological ROS: No Rash, Itching, Hives, Mole Changes or Cancer PHYSICAL Exam:     Constitutional:  Vitals:    10/20/22 1237   BP: 116/74   Site: Right Upper Arm   Position: Sitting   Cuff Size: Medium Adult   Weight: 173 lb (78.5 kg)   Height: 5' 5\" (1.651 m)       Chaperone for Intimate Exam  Chaperone was offered and accepted as part of the rooming process. Chaperone: Jaimee SOLIS          General Appearance: This  is a well Developed, well Nourished, well groomed female. Her BMI was reviewed. Nutritional decision making was discussed. Skin:  There was a Normal Inspection of the skin without rashes or lesions. There were no rashes. (Papular, Maculopapular, Hives, Pustular, Macular)     There were no lesions (Ulcers, Erythema, Abn. Appearing Nevi)            Lymphatic:  No Lymph Nodes were Palpable in the neck , axilla or groin.  0 # Of Lymph Nodes; Location ; Character [Normal]  [Shotty] [Tender] [Enlarged]     Neck and EENT:  The neck was supple. There were no masses   The thyroid was not enlarged and had no masses. Perrla, EOMI B/L, TMI B/L No Abnormalities. Throat inspected-No exudates or Masses, Nares Patent No Masses        Respiratory: The lungs were auscultated and found to be clear. There were no rales, rhonchi or wheezes. There was a good respiratory effort. Cardiovascular: The heart was in a regular rate and rhythm. . No S3 or S4. There was no murmur appreciated. Location, grade, and radiation are not applicable. Extremities: The patients extremities were without calf tenderness, edema, or varicosities. There was full range of motion in all four extremities. Pulses in all four extremities were appreciated and are 2/4. Abdomen: The abdomen was soft and non-tender. There were good bowel sounds in all quadrants and there was no guarding, rebound or rigidity.   On evaluation there was no evidence of hepatosplenomegaly and there was no costal vertebral dean tenderness bilaterally. No hernias were appreciated. Abdominal Scars: none    Psych: The patient had a normal Orientation to: Time, Place, Person, and Situation  There is no Mood / Affect changes    Breast:  (Chest)  normal appearance, no masses or tenderness, Inspection negative, No nipple retraction or dimpling, No nipple discharge or bleeding, No axillary or supraclavicular adenopathy, Normal to palpation without dominant masses  Self breast exams were reviewed in detail. Literature was given. Pelvic Exam:  External genitalia: normal general appearance  Urinary system: urethral meatus normal  Vaginal: normal mucosa without prolapse or lesions  Cervix: normal appearance  Adnexa: normal bimanual exam  Uterus: normal single, nontender    Rectal Exam:  exam declined by patient          Musculosk:  Normal Gait and station was noted. Digits were evaluated without abnormal findings. Range of motion, stability and strength were evaluated and found to be appropriate for the patients age. ASSESSMENT:      33 y.o. Annual   Diagnosis Orders   1. Well female exam with routine gynecological exam  PAP SMEAR      2. Special screening examination for human papillomavirus (HPV)  PAP SMEAR             Chief Complaint   Patient presents with    Annual Exam          Past Medical History:   Diagnosis Date    Asthma     Attention deficit disorder (ADD)     Depression     LSIL (low grade squamous intraepithelial lesion) on Pap smear 9/12/12    LSIL (low grade squamous intraepithelial lesion) on Pap smear          Patient Active Problem List    Diagnosis Date Noted    LGSIL of cervix of undetermined significance     Asthma     Depression     Attention deficit disorder (ADD)           Hereditary Breast, Ovarian, Colon and Uterine Cancer screening Done.           Tobacco & Secondary smoke risks reviewed; instructed on cessation and avoidance      Counseling Completed:  Preventative Health Recommendations and Follow up.  The patient was informed of the recommended preventative health recommendations. 1. Annuals every year; Cytology collections per prevailing guidelines. 2. Mammograms begin every year at 35 yo if no abnormalities are found and no family history. 3. Bone density studies every 2-3 years. Begin at 71 yo. If no fracture history or osteoporosis family history. (significant). 4. Colonoscopy begin at 38 yo. Repeat every ten years if negative and no family history. 5. Calcium of 7584-8126 mg/day in split dosing  6. Vitamin D 400-800 IU/day  7. All other preventative health recommendations will be managed by the patients Primary care physician. PLAN:  Return in about 1 year (around 10/20/2023) for annual.  Pap smear collected  Repeat Annual every 1 year  Cervical Cytology Evaluation begins at 24years old. If Negative Cytology, Follow-up screening per current guidelines. Mammograms every 1 year. If 35 yo and last mammogram was negative. Calcium and Vitamin D dosing reviewed. Colonoscopy screening reviewed as well as onset for bone density testing. Birth control and barrier recommendations discussed. STD counseling and prevention reviewed. Gardisil counseling completed for all patients 10-35 yo. Routine health maintenance per patients PCP. Orders Placed This Encounter   Procedures    PAP SMEAR     Patient History:    No LMP recorded. OBGYN Status: Having periods  No past surgical history on file. Social History    Tobacco Use      Smoking status: Never      Smokeless tobacco: Never       Standing Status:   Future     Standing Expiration Date:   10/19/2023     Order Specific Question:   Collection Type     Answer:    Thin Prep     Order Specific Question:   Prior Abnormal Pap Test     Answer:   Yes     Order Specific Question:   If Prior Abnormal, Give Date     Answer:   2012     Order Specific Question:   Prior Treatment     Answer:   None Given     Order Specific Question:   Screening or Diagnostic     Answer:   Screening     Order Specific Question:   HPV Requested? Answer:   Yes     Order Specific Question:   High Risk Patient     Answer:   N/A           The patient, Lelo Acosta is a 33 y.o. female, was seen with a total time spent of 30 minutes for the visit on this date of service by the E/M provider. The time component had both face to face and non face to face time spent in determining the total time component. Counseling and education regarding her diagnosis listed below and her options regarding those diagnoses were also included in determining her time component. Diagnosis Orders   1. Well female exam with routine gynecological exam  PAP SMEAR      2. Special screening examination for human papillomavirus (HPV)  PAP SMEAR           The patient had her preventative health maintenance recommendations and follow-up reviewed with her at the completion of her visit.

## 2022-10-25 LAB
HPV SAMPLE: ABNORMAL
HPV, GENOTYPE 16: DETECTED
HPV, GENOTYPE 18: NOT DETECTED
HPV, HIGH RISK OTHER: NOT DETECTED
HPV, INTERPRETATION: ABNORMAL
SPECIMEN DESCRIPTION: ABNORMAL

## 2022-10-27 LAB — CYTOLOGY REPORT: NORMAL

## 2022-11-02 ENCOUNTER — TELEPHONE (OUTPATIENT)
Dept: OBGYN CLINIC | Age: 32
End: 2022-11-02

## 2022-11-02 NOTE — TELEPHONE ENCOUNTER
----- Message from SHONDA Rubalcava - NP sent at 10/27/2022  2:34 PM EDT -----  Pap smear ascus  HPV + 12  Age 33 y.o.   Patient needs colp scheduled

## 2022-11-03 NOTE — TELEPHONE ENCOUNTER
Patient notified of results and recommendations. Information given to Mariela Costa to schedule patient colposcopy with .

## 2023-01-20 RX ORDER — SPIRONOLACTONE 50 MG/1
TABLET, FILM COATED ORAL
Qty: 60 TABLET | Refills: 5 | Status: SHIPPED | OUTPATIENT
Start: 2023-01-20

## 2023-02-20 RX ORDER — NORETHINDRONE ACETATE AND ETHINYL ESTRADIOL 1MG-20(21)
KIT ORAL
Qty: 28 TABLET | Refills: 8 | Status: SHIPPED | OUTPATIENT
Start: 2023-02-20

## 2023-03-24 ENCOUNTER — PROCEDURE VISIT (OUTPATIENT)
Dept: OBGYN CLINIC | Age: 33
End: 2023-03-24

## 2023-03-24 ENCOUNTER — HOSPITAL ENCOUNTER (OUTPATIENT)
Age: 33
Setting detail: SPECIMEN
Discharge: HOME | End: 2023-03-24

## 2023-03-24 VITALS
DIASTOLIC BLOOD PRESSURE: 60 MMHG | BODY MASS INDEX: 29.16 KG/M2 | WEIGHT: 175 LBS | HEIGHT: 65 IN | SYSTOLIC BLOOD PRESSURE: 114 MMHG

## 2023-03-24 DIAGNOSIS — B97.7 HPV IN FEMALE: ICD-10-CM

## 2023-03-24 DIAGNOSIS — R87.810 ASCUS WITH POSITIVE HIGH RISK HPV CERVICAL: Primary | ICD-10-CM

## 2023-03-24 DIAGNOSIS — R87.610 ASCUS WITH POSITIVE HIGH RISK HPV CERVICAL: Primary | ICD-10-CM

## 2023-03-24 LAB
CONTROL: NORMAL
PREGNANCY TEST URINE, POC: NEGATIVE

## 2023-03-24 NOTE — PROGRESS NOTES
[facility] Obstetrics & Gynecology    COLPOSCOPY PROCEDURE FORM    Chief Complaint:   Chief Complaint   Patient presents with    Procedure         3/24/2023  Megan Khat  Patient's last menstrual period was 03/05/2023.  33 y.o.  Camacho Esa    Past Medical History:   Diagnosis Date    Asthma     Attention deficit disorder (ADD)     Depression     LSIL (low grade squamous intraepithelial lesion) on Pap smear 9/12/12    LSIL (low grade squamous intraepithelial lesion) on Pap smear          History reviewed. No pertinent surgical history.     Family History   Problem Relation Age of Onset    Diabetes Father     Cirrhosis Father     Heart Disease Mother         tachycardia    Depression Mother     Anemia Mother     Other Mother         bulemia       Social History     Socioeconomic History    Marital status: Single     Spouse name: Not on file    Number of children: Not on file    Years of education: Not on file    Highest education level: Not on file   Occupational History    Not on file   Tobacco Use    Smoking status: Never    Smokeless tobacco: Never   Substance and Sexual Activity    Alcohol use: No    Drug use: No    Sexual activity: Yes     Partners: Male   Other Topics Concern    Not on file   Social History Narrative    Not on file     Social Determinants of Health     Financial Resource Strain: Not on file   Food Insecurity: Not on file   Transportation Needs: Not on file   Physical Activity: Not on file   Stress: Not on file   Social Connections: Not on file   Intimate Partner Violence: Not on file   Housing Stability: Not on file       Current Outpatient Medications on File Prior to Visit   Medication Sig Dispense Refill    BLISOVI FE 1/20 1-20 MG-MCG per tablet TAKE ONE TABLET BY MOUTH DAILY 28 tablet 8    spironolactone (ALDACTONE) 50 MG tablet TAKE 1 AND 1/2 TABLET BY MOUTH DAILY FOR 7 DAYS, THEN INCREASE TO TAKE TWO TABLETS BY MOUTH DAILY IF TOLERATING 60 tablet 5    spironolactone (ALDACTONE) 100 MG

## 2023-03-28 LAB — SURGICAL PATHOLOGY REPORT: NORMAL

## 2023-03-29 ENCOUNTER — TELEPHONE (OUTPATIENT)
Dept: OBGYN CLINIC | Age: 33
End: 2023-03-29

## 2023-03-29 NOTE — TELEPHONE ENCOUNTER
----- Message from SHONDA Driscoll CNP sent at 3/28/2023  4:53 PM EDT -----  Benign ECC  Please review results with DR Carla Contreras and schedule follow up accordingly

## 2023-03-29 NOTE — TELEPHONE ENCOUNTER
Patient notified of negative results, per 's procedure note patient needs to return to office for annual if pathology is negative.  Patient voiced understanding, has appointment scheduled for annual.

## 2023-08-07 ENCOUNTER — TELEMEDICINE (OUTPATIENT)
Dept: DERMATOLOGY | Age: 33
End: 2023-08-07
Payer: COMMERCIAL

## 2023-08-07 DIAGNOSIS — L70.0 ACNE VULGARIS: ICD-10-CM

## 2023-08-07 DIAGNOSIS — L53.9 FACIAL ERYTHEMA: Primary | ICD-10-CM

## 2023-08-07 PROCEDURE — 99213 OFFICE O/P EST LOW 20 MIN: CPT | Performed by: DERMATOLOGY

## 2023-08-07 RX ORDER — SPIRONOLACTONE 100 MG/1
TABLET, FILM COATED ORAL
Qty: 30 TABLET | Refills: 11 | Status: SHIPPED | OUTPATIENT
Start: 2023-08-07

## 2023-08-07 NOTE — PROGRESS NOTES
[facility] sent 08/07/2023 @ 1:17pm.
Vitals/Constitutional/EENT/Resp/CV/GI//MS/Neuro/Skin/Heme-Lymph-Imm. In particular examination of the skin is limited by video quality and patient available technology. There were no vitals taken for this visit. The patient is generally well appearing, well nourished, alert and conversational. Affect is normal.    Cutaneous Exam:  Physical Exam  Face and neck only were examined    Diagnoses/exam findings/medical history pertinent to this visit are listed below:    Assessment and Plan:  Assessment   1. Acne vulgaris  - stable chronic illness   - spironolactone (ALDACTONE) 100 MG tablet; Take one tablet PO daily  Dispense: 30 tablet; Refill: 11  - tretinoin (RETIN-A) 0.025 % cream; Apply pea sized amount to face nightly  Dispense: 45 g; Refill: 11    2. Facial erythema  - start compounded oxymetazoline 1% for daily ([facility])          RTC 6 months    There are no Patient Instructions on file for this visit. Services were provided through a video synchronous discussion virtually to substitute for in-person clinic visit. Electronically signed by Ute Mcpherson MD on 8/7/23 at 1:00 PM EDT    Brook Dowling, was evaluated through a synchronous (real-time) audio-video encounter. The patient (or guardian if applicable) is aware that this is a billable service, which includes applicable co-pays. This Virtual Visit was conducted with patient's (and/or legal guardian's) consent. Patient identification was verified, and a caregiver was present when appropriate. The patient was located at Home: [address] [place] [address]  Provider was located at [facility] ([address] [place] [address]): [address] [place] [address] [address] [facility]         Total time spent for this encounter: Not billed by Davina Davenport MD on 8/7/2023 at 1:00 PM    An electronic signature was used to authenticate this note.

## 2023-08-21 RX ORDER — SPIRONOLACTONE 50 MG/1
TABLET, FILM COATED ORAL
Qty: 60 TABLET | Refills: 5 | OUTPATIENT
Start: 2023-08-21

## 2023-11-02 PROBLEM — F41.1 GENERALIZED ANXIETY DISORDER: Status: ACTIVE | Noted: 2023-05-15

## 2023-11-02 PROBLEM — M54.50 LOW BACK PAIN, UNSPECIFIED: Status: ACTIVE | Noted: 2023-05-15

## 2023-11-02 RX ORDER — BUSPIRONE HYDROCHLORIDE 5 MG/1
TABLET ORAL
COMMUNITY
Start: 2023-07-28

## 2023-11-02 RX ORDER — ALPRAZOLAM 0.5 MG/1
TABLET ORAL
COMMUNITY
Start: 2023-08-17

## 2023-11-02 RX ORDER — CYCLOBENZAPRINE HCL 10 MG
TABLET ORAL
COMMUNITY
Start: 2023-07-28

## 2023-11-06 ENCOUNTER — OFFICE VISIT (OUTPATIENT)
Dept: OBGYN CLINIC | Age: 33
End: 2023-11-06
Payer: COMMERCIAL

## 2023-11-06 ENCOUNTER — HOSPITAL ENCOUNTER (OUTPATIENT)
Age: 33
Setting detail: SPECIMEN
Discharge: HOME | End: 2023-11-06

## 2023-11-06 VITALS
SYSTOLIC BLOOD PRESSURE: 102 MMHG | BODY MASS INDEX: 28.99 KG/M2 | DIASTOLIC BLOOD PRESSURE: 62 MMHG | HEIGHT: 65 IN | WEIGHT: 174 LBS

## 2023-11-06 DIAGNOSIS — Z11.51 SPECIAL SCREENING EXAMINATION FOR HUMAN PAPILLOMAVIRUS (HPV): ICD-10-CM

## 2023-11-06 DIAGNOSIS — Z20.2 STD EXPOSURE: ICD-10-CM

## 2023-11-06 DIAGNOSIS — Z01.419 WELL FEMALE EXAM WITH ROUTINE GYNECOLOGICAL EXAM: Primary | ICD-10-CM

## 2023-11-06 PROCEDURE — 99395 PREV VISIT EST AGE 18-39: CPT | Performed by: NURSE PRACTITIONER

## 2023-11-06 NOTE — PROGRESS NOTES
unspecified 5/15/2023    LSIL (low grade squamous intraepithelial lesion) on Pap smear 9/12/12    LSIL (low grade squamous intraepithelial lesion) on Pap smear          Patient Active Problem List    Diagnosis Date Noted    Low back pain, unspecified 05/15/2023    Generalized anxiety disorder 05/15/2023    LGSIL of cervix of undetermined significance     Asthma     Depression     Attention deficit disorder (ADD)           Hereditary Breast, Ovarian, Colon and Uterine Cancer screening Done. Tobacco & Secondary smoke risks reviewed; instructed on cessation and avoidance      Counseling Completed:  Preventative Health Recommendations and Follow up. The patient was informed of the recommended preventative health recommendations. 1. Annuals every year; Cytology collections per prevailing guidelines. 2. Mammograms begin every year at 35 yo if no abnormalities are found and no family history. 3. Bone density studies every 2-3 years. Begin at 71 yo. If no fracture history or osteoporosis family history. (significant). 4. Colonoscopy begin at 38 yo. Repeat every ten years if negative and no family history. 5. Calcium of 7165-8006 mg/day in split dosing  6. Vitamin D 400-800 IU/day  7. All other preventative health recommendations will be managed by the patients Primary care physician. PLAN:  Return in about 1 year (around 11/6/2024) for annual.  Pap smear collected  Vaginal cultures collected  Labs ordered   Repeat Annual every 1 year  Cervical Cytology Evaluation begins at 24years old. If Negative Cytology, Follow-up screening per current guidelines. Mammograms every 1 year. If 35 yo and last mammogram was negative. Calcium and Vitamin D dosing reviewed. Colonoscopy screening reviewed as well as onset for bone density testing. Birth control and barrier recommendations discussed. STD counseling and prevention reviewed. Gardisil counseling completed for all patients 7-38 yo.   Routine

## 2023-11-07 LAB
HPV I/H RISK 4 DNA CVX QL NAA+PROBE: DETECTED
HPV SAMPLE: ABNORMAL
HPV, INTERPRETATION: ABNORMAL
HPV16 DNA CVX QL NAA+PROBE: DETECTED
HPV18 DNA CVX QL NAA+PROBE: NOT DETECTED
SPECIMEN DESCRIPTION: ABNORMAL

## 2023-11-09 LAB — CYTOLOGY REPORT: NORMAL

## 2023-11-10 ENCOUNTER — TELEPHONE (OUTPATIENT)
Dept: OBGYN CLINIC | Age: 33
End: 2023-11-10

## 2023-11-10 NOTE — TELEPHONE ENCOUNTER
----- Message from SHONDA Woodall NP sent at 11/9/2023  4:05 PM EST -----  Pap smear LSIL  HPV + 16 and other   Age 29 y.o.   Patient needs colp scheduled

## 2023-12-14 PROBLEM — R41.840 ATTENTION AND CONCENTRATION DEFICIT: Status: ACTIVE | Noted: 2023-09-05

## 2023-12-14 PROBLEM — R12 HEARTBURN: Status: ACTIVE | Noted: 2023-09-05

## 2023-12-14 PROBLEM — K59.04 CHRONIC IDIOPATHIC CONSTIPATION: Status: ACTIVE | Noted: 2023-09-05

## 2023-12-14 PROBLEM — F39 MOOD DISORDER (HCC): Status: ACTIVE | Noted: 2023-07-28

## 2024-02-27 NOTE — TELEPHONE ENCOUNTER
Pt requesting refill RX for BV flare up    [facility] Title: Evaluation of Skin Lesions How Severe Are Your Spot(S)?: mild Have Your Spot(S) Been Treated In The Past?: has not been treated

## 2024-04-05 ENCOUNTER — HOSPITAL ENCOUNTER (OUTPATIENT)
Age: 34
Setting detail: SPECIMEN
Discharge: HOME | End: 2024-04-05

## 2024-04-05 ENCOUNTER — PROCEDURE VISIT (OUTPATIENT)
Dept: OBGYN CLINIC | Age: 34
End: 2024-04-05

## 2024-04-05 VITALS
WEIGHT: 174 LBS | BODY MASS INDEX: 28.99 KG/M2 | SYSTOLIC BLOOD PRESSURE: 128 MMHG | DIASTOLIC BLOOD PRESSURE: 80 MMHG | HEIGHT: 65 IN

## 2024-04-05 DIAGNOSIS — Z32.02 NEGATIVE PREGNANCY TEST: ICD-10-CM

## 2024-04-05 DIAGNOSIS — B97.7 HPV IN FEMALE: ICD-10-CM

## 2024-04-05 DIAGNOSIS — R87.612 LGSIL OF CERVIX OF UNDETERMINED SIGNIFICANCE: Primary | ICD-10-CM

## 2024-04-05 LAB
CONTROL: NORMAL
PREGNANCY TEST URINE, POC: NORMAL

## 2024-04-05 RX ORDER — BUSPIRONE HYDROCHLORIDE 7.5 MG/1
7.5 TABLET ORAL
COMMUNITY
Start: 2024-03-04

## 2024-04-05 NOTE — PROGRESS NOTES
[facility] Obstetrics & Gynecology    COLPOSCOPY PROCEDURE FORM    Chief Complaint:   Chief Complaint   Patient presents with    Procedure         2024  Natalie Radfordval  Patient's last menstrual period was 2024 (approximate).  33 y.o.      Past Medical History:   Diagnosis Date    Asthma     Attention deficit disorder (ADD)     Depression     Generalized anxiety disorder 5/15/2023    Low back pain, unspecified 5/15/2023    LSIL (low grade squamous intraepithelial lesion) on Pap smear 12    LSIL (low grade squamous intraepithelial lesion) on Pap smear          History reviewed. No pertinent surgical history.    Family History   Problem Relation Age of Onset    Diabetes Father     Cirrhosis Father     Heart Disease Mother         tachycardia    Depression Mother     Anemia Mother     Other Mother         bulemia       Social History     Socioeconomic History    Marital status: Single     Spouse name: Not on file    Number of children: Not on file    Years of education: Not on file    Highest education level: Not on file   Occupational History    Not on file   Tobacco Use    Smoking status: Never    Smokeless tobacco: Never   Substance and Sexual Activity    Alcohol use: No    Drug use: No    Sexual activity: Yes     Partners: Male   Other Topics Concern    Not on file   Social History Narrative    Not on file     Social Determinants of Health     Financial Resource Strain: Not on file   Food Insecurity: Not on file   Transportation Needs: Not on file   Physical Activity: Not on file   Stress: Not on file   Social Connections: Not on file   Intimate Partner Violence: Not on file   Housing Stability: Not on file       Current Outpatient Medications on File Prior to Visit   Medication Sig Dispense Refill    busPIRone (BUSPAR) 7.5 MG tablet 1 tablet      sertraline (ZOLOFT) 50 MG tablet       ALPRAZolam (XANAX) 0.5 MG tablet       spironolactone (ALDACTONE) 100 MG tablet Take one tablet PO daily

## 2024-04-09 LAB — SURGICAL PATHOLOGY REPORT: NORMAL

## 2024-04-11 ENCOUNTER — TELEPHONE (OUTPATIENT)
Dept: OBGYN CLINIC | Age: 34
End: 2024-04-11

## 2024-04-11 NOTE — TELEPHONE ENCOUNTER
Per Dr Angel pt notified of Colposcopy results and pt to repeat pap in 1 year.  Annual scheduled.

## 2024-04-11 NOTE — TELEPHONE ENCOUNTER
----- Message from SHONDA Steve CNP sent at 4/9/2024  9:52 AM EDT -----  Patient had benign colposcopy with DR Angel  Please review with DR Angel and schedule follow up accordingly.

## 2024-07-22 ENCOUNTER — HOSPITAL ENCOUNTER (OUTPATIENT)
Age: 34
Setting detail: SPECIMEN
Discharge: HOME | End: 2024-07-22

## 2024-07-22 ENCOUNTER — OFFICE VISIT (OUTPATIENT)
Dept: OBGYN CLINIC | Age: 34
End: 2024-07-22
Payer: COMMERCIAL

## 2024-07-22 VITALS
DIASTOLIC BLOOD PRESSURE: 76 MMHG | WEIGHT: 176 LBS | SYSTOLIC BLOOD PRESSURE: 122 MMHG | HEIGHT: 65 IN | BODY MASS INDEX: 29.32 KG/M2

## 2024-07-22 DIAGNOSIS — N76.0 ACUTE VAGINITIS: ICD-10-CM

## 2024-07-22 DIAGNOSIS — Z11.3 SCREENING FOR STD (SEXUALLY TRANSMITTED DISEASE): ICD-10-CM

## 2024-07-22 DIAGNOSIS — N89.8 VAGINAL IRRITATION: ICD-10-CM

## 2024-07-22 DIAGNOSIS — N89.8 VAGINAL IRRITATION: Primary | ICD-10-CM

## 2024-07-22 LAB
CANDIDA SPECIES: NEGATIVE
GARDNERELLA VAGINALIS: NEGATIVE
SOURCE: NORMAL
TRICHOMONAS: NEGATIVE

## 2024-07-22 PROCEDURE — 99213 OFFICE O/P EST LOW 20 MIN: CPT | Performed by: NURSE PRACTITIONER

## 2024-07-22 RX ORDER — CYCLOBENZAPRINE HCL 5 MG
TABLET ORAL
COMMUNITY
Start: 2024-07-19

## 2024-07-22 RX ORDER — OMEPRAZOLE 20 MG/1
CAPSULE, DELAYED RELEASE ORAL
COMMUNITY
Start: 2024-07-19

## 2024-07-22 RX ORDER — BUPROPION HYDROCHLORIDE 150 MG/1
TABLET ORAL
COMMUNITY
Start: 2024-07-19

## 2024-07-22 RX ORDER — SERTRALINE HYDROCHLORIDE 100 MG/1
TABLET, FILM COATED ORAL
COMMUNITY
Start: 2024-06-25

## 2024-07-22 RX ORDER — PREDNISONE 20 MG/1
TABLET ORAL
COMMUNITY
Start: 2024-04-24

## 2024-07-22 RX ORDER — CLOTRIMAZOLE AND BETAMETHASONE DIPROPIONATE 10; .64 MG/G; MG/G
CREAM TOPICAL
Qty: 45 G | Refills: 1 | Status: SHIPPED | OUTPATIENT
Start: 2024-07-22

## 2024-07-22 NOTE — PROGRESS NOTES
Patient was in the office today for a std panel.    Draw per physician order using sterile technique.  Drawn from the left AC.   
M.D./tb1:4/9/2024  Microscopic Description  Microscopic examination performed.     Processing Lab:  [facility] [address] [place] [id number]  Interpretation Performed at [facility] [address] [place] [address]    SURGICAL PATHOLOGY CONSULTATION       Patient Name: DANAE LIU  [facility] Rec: [id number]  [facility]  [facility]  ANATOMIC PATHOLOGY  [address]  [facility]  [place], [place] [address] [phone]  Fax: [phone]           Assessment:   Diagnosis Orders   1. Vaginal irritation  C.trachomatis N.gonorrhoeae DNA    Vaginitis DNA Probe    Hepatitis B Surface Antigen    Hepatitis C Antibody    HERPES PROFILE    HIV Screen    T. pallidum Ab      2. Screening for STD (sexually transmitted disease)  C.trachomatis N.gonorrhoeae DNA    Vaginitis DNA Probe    Hepatitis B Surface Antigen    Hepatitis C Antibody    HERPES PROFILE    HIV Screen    T. pallidum Ab    65916 - Venipuncture      3. Acute vaginitis  C.trachomatis N.gonorrhoeae DNA    Vaginitis DNA Probe    clotrimazole-betamethasone (LOTRISONE) 1-0.05 % cream        Patient Active Problem List    Diagnosis Date Noted    Attention and concentration deficit 09/05/2023    Chronic idiopathic constipation 09/05/2023    Heartburn 09/05/2023    Mood disorder (HCC) 07/28/2023    Low back pain, unspecified 05/15/2023    Generalized anxiety disorder 05/15/2023    LGSIL of cervix of undetermined significance     Asthma     Depression     Attention deficit disorder (ADD)            PLAN:  Return if symptoms worsen or fail to improve.  Vaginal cultures collected.  STI labs drawn.  Repeat Annual every 1 year  Cervical Cytology Evaluation begins at 21 years old.  If Negative Cytology, Follow-up screening per current guidelines.   Return to the office in PRN weeks.  Counseled on preventative health maintenance follow-up.  Orders Placed This Encounter   Procedures    C.trachomatis

## 2024-07-23 LAB
C TRACH DNA SPEC QL PROBE+SIG AMP: NEGATIVE
HBV SURFACE AG SERPL QL IA: NONREACTIVE
HCV AB SERPL QL IA: NONREACTIVE
HIV 1+2 AB+HIV1 P24 AG SERPL QL IA: NONREACTIVE
HSV1 IGG SERPL QL IA: 0.19
HSV1+2 IGM SER QL IA: 0.58
HSV2 AB SER QL IA: 0.15
N GONORRHOEA DNA SPEC QL PROBE+SIG AMP: NEGATIVE
SPECIMEN DESCRIPTION: NORMAL
T PALLIDUM AB SER QL IA: NONREACTIVE

## 2024-07-25 ENCOUNTER — TELEPHONE (OUTPATIENT)
Dept: OBGYN CLINIC | Age: 34
End: 2024-07-25

## 2024-07-25 NOTE — TELEPHONE ENCOUNTER
Pt calling into office for clarification on results from 7/22/24. Pt notified of all results being negative.

## 2024-09-10 DIAGNOSIS — L70.0 ACNE VULGARIS: ICD-10-CM

## 2024-09-10 RX ORDER — SPIRONOLACTONE 100 MG/1
TABLET, FILM COATED ORAL
Qty: 30 TABLET | Refills: 11 | OUTPATIENT
Start: 2024-09-10

## 2024-09-20 DIAGNOSIS — L70.0 ACNE VULGARIS: ICD-10-CM

## 2024-09-20 RX ORDER — SPIRONOLACTONE 100 MG/1
TABLET, FILM COATED ORAL
Qty: 30 TABLET | Refills: 11 | OUTPATIENT
Start: 2024-09-20

## 2024-10-02 DIAGNOSIS — L70.0 ACNE VULGARIS: ICD-10-CM

## 2024-10-02 RX ORDER — SPIRONOLACTONE 100 MG/1
TABLET, FILM COATED ORAL
Qty: 30 TABLET | Refills: 0 | Status: SHIPPED | OUTPATIENT
Start: 2024-10-02

## 2024-10-02 RX ORDER — SPIRONOLACTONE 100 MG/1
TABLET, FILM COATED ORAL
Qty: 30 TABLET | Refills: 0 | Status: SHIPPED | OUTPATIENT
Start: 2024-10-02 | End: 2024-10-02 | Stop reason: SDUPTHER

## 2024-10-02 NOTE — TELEPHONE ENCOUNTER
Patient called is needing a refill   spironolactone (ALDACTONE) 100 MG tablet     Patient is needing a visit but would like to be seen before next year    Please contact to schedule   
Pt has upcoming vv on 10/11/24, requesting refill for consuelo   
 used

## 2024-10-04 ENCOUNTER — TELEPHONE (OUTPATIENT)
Dept: OBGYN CLINIC | Age: 34
End: 2024-10-04

## 2024-10-04 DIAGNOSIS — L70.0 ACNE VULGARIS: ICD-10-CM

## 2024-10-04 RX ORDER — SPIRONOLACTONE 100 MG/1
TABLET, FILM COATED ORAL
Qty: 30 TABLET | Refills: 0 | OUTPATIENT
Start: 2024-10-04

## 2024-10-04 RX ORDER — METRONIDAZOLE 7.5 MG/G
GEL TOPICAL
Qty: 45 G | Refills: 0 | Status: SHIPPED | OUTPATIENT
Start: 2024-10-04

## 2024-10-04 NOTE — TELEPHONE ENCOUNTER
Pt called in w symptoms of BV , she is asking if she can get a script called in for the Metrogel , please call into [facility]

## 2024-10-07 ENCOUNTER — TELEPHONE (OUTPATIENT)
Dept: OBGYN CLINIC | Age: 34
End: 2024-10-07

## 2024-10-07 RX ORDER — METRONIDAZOLE 7.5 MG/G
1 GEL VAGINAL NIGHTLY
Qty: 1 EACH | Refills: 0 | Status: SHIPPED | OUTPATIENT
Start: 2024-10-07 | End: 2024-10-12

## 2024-10-07 NOTE — TELEPHONE ENCOUNTER
Pt requesting a new script for metrogel .   Pt states would like tube instructed for \"topical use\" -, rx was filled with instructions for her to insert     [facility]

## 2024-10-07 NOTE — TELEPHONE ENCOUNTER
Patient requesting corrected order for metrogel, patient states the one she got was for topical use, she knows that it is the same thing but patient prefers to have the vaginal applicator to insert the gel.

## 2024-10-11 ENCOUNTER — TELEMEDICINE (OUTPATIENT)
Dept: DERMATOLOGY | Age: 34
End: 2024-10-11
Payer: COMMERCIAL

## 2024-10-11 DIAGNOSIS — L53.9 FACIAL ERYTHEMA: ICD-10-CM

## 2024-10-11 DIAGNOSIS — Z79.899 ON POTASSIUM SPARING DIURETIC THERAPY: ICD-10-CM

## 2024-10-11 DIAGNOSIS — R23.3 EASY BRUISING: Primary | ICD-10-CM

## 2024-10-11 DIAGNOSIS — L70.0 ACNE VULGARIS: ICD-10-CM

## 2024-10-11 PROCEDURE — 99214 OFFICE O/P EST MOD 30 MIN: CPT | Performed by: DERMATOLOGY

## 2024-10-11 RX ORDER — TRETINOIN 0.25 MG/G
CREAM TOPICAL
Qty: 45 G | Refills: 11 | Status: SHIPPED | OUTPATIENT
Start: 2024-10-11

## 2024-10-11 RX ORDER — SPIRONOLACTONE 100 MG/1
TABLET, FILM COATED ORAL
Qty: 30 TABLET | Refills: 11 | Status: SHIPPED | OUTPATIENT
Start: 2024-10-11

## 2024-10-11 NOTE — PROGRESS NOTES
TELEHEALTH EVALUATION -- Audio/Visual    Dermatology Patient Note  [facility], [facility] DERMATOLOGY  [facility]  [address]  [place] [address]  Dept: [phone]  Dept Fax: [phone]      VISITDATE: 10/11/2024   REFERRING PROVIDER: No ref. provider found      Natalie Umanzor is a 34 y.o. female  who presents today in the office for:    No chief complaint on file.      HISTORY OF PRESENT ILLNESS:  35 yo F presents for acne f/u. Previously completed isotretinoin and has remained stable on spironolactone 100 mg daily. She has been off of it for a little while and is having some recurrence of acne. She has not being using tretinoin cream consistently due to dryness. She does not recall receiving or using the oxymetazoline compounded cream for redness. She has not yet had a laser treatment for scarring and redness.    Today she complains of dark circles under her eyes    She also notes very easy bruising recently. She has been taking iron for this    CURRENT MEDICATIONS:   Current Outpatient Medications   Medication Sig Dispense Refill    tretinoin (RETIN-A) 0.025 % cream Apply pea sized amount to face nightly 45 g 11    spironolactone (ALDACTONE) 100 MG tablet Take one tablet PO daily 30 tablet 11    metroNIDAZOLE (METROGEL) 0.75 % vaginal gel Place 1 Applicatorful vaginally at bedtime for 5 days 1 each 0    metroNIDAZOLE (METROGEL) 0.75 % gel Apply topically 2 times daily. 45 g 0    buPROPion (WELLBUTRIN XL) 150 MG extended release tablet       cyclobenzaprine (FLEXERIL) 5 MG tablet       omeprazole (PRILOSEC) 20 MG delayed release capsule       predniSONE (DELTASONE) 20 MG tablet       sertraline (ZOLOFT) 100 MG tablet       clotrimazole-betamethasone (LOTRISONE) 1-0.05 % cream Apply to affected area bid externally x 4 days then daily for 3 days 45 g 1    busPIRone (BUSPAR) 7.5 MG tablet 1 tablet      sertraline (ZOLOFT) 50 MG tablet

## 2024-12-30 ENCOUNTER — TELEPHONE (OUTPATIENT)
Age: 34
End: 2024-12-30

## 2024-12-30 NOTE — TELEPHONE ENCOUNTER
Pt called in stating she was having a cystic acne break out and it is very painful. Pt stated she has not had this type of break out since off of Accutane.

## 2025-01-31 ENCOUNTER — TELEMEDICINE (OUTPATIENT)
Age: 35
End: 2025-01-31

## 2025-01-31 DIAGNOSIS — Z79.899 ON ISOTRETINOIN THERAPY: Primary | ICD-10-CM

## 2025-01-31 DIAGNOSIS — L70.0 ACNE VULGARIS: ICD-10-CM

## 2025-01-31 NOTE — PROGRESS NOTES
TELEHEALTH EVALUATION -- Audio/Visual    Dermatology Patient Note  [facility]  [facility] DERMATOLOGY  [address]  [place] [address]  Dept: [phone]  Dept Fax: [phone]      VISITDATE: 1/31/2025   REFERRING PROVIDER: No ref. provider found      Natalie Umanzor is a 34 y.o. female  who presents today in the office for:    No chief complaint on file.      HISTORY OF PRESENT ILLNESS:  Patient presents for acne f/u  She completed course of isotretinoin in December 2020  Since then has had some recurrence treated with tretinoin cream and more recently spironolactone 100 mg  Currently having a deep cystic flare not controlled with above regimen  She is hoping to have a HALO treatment in March    CURRENT MEDICATIONS:   Current Outpatient Medications   Medication Sig Dispense Refill    tretinoin (RETIN-A) 0.025 % cream Apply pea sized amount to face nightly 45 g 11    spironolactone (ALDACTONE) 100 MG tablet Take one tablet PO daily 30 tablet 11    metroNIDAZOLE (METROGEL) 0.75 % gel Apply topically 2 times daily. 45 g 0    buPROPion (WELLBUTRIN XL) 150 MG extended release tablet       cyclobenzaprine (FLEXERIL) 5 MG tablet       omeprazole (PRILOSEC) 20 MG delayed release capsule       predniSONE (DELTASONE) 20 MG tablet       sertraline (ZOLOFT) 100 MG tablet       clotrimazole-betamethasone (LOTRISONE) 1-0.05 % cream Apply to affected area bid externally x 4 days then daily for 3 days 45 g 1    busPIRone (BUSPAR) 7.5 MG tablet 1 tablet      sertraline (ZOLOFT) 50 MG tablet       ALPRAZolam (XANAX) 0.5 MG tablet        Current Facility-Administered Medications   Medication Dose Route Frequency Provider Last Rate Last Admin    lidocaine 1 % injection 5 mg  5 mg IntraDERmal Once Elsa Guan MD           ALLERGIES:   Allergies   Allergen Reactions    Neosporin [Bacitracin-Neomycin-Polymyxin] Rash       SOCIAL HISTORY:  Social History     Tobacco Use    Smoking

## 2025-02-18 ENCOUNTER — TELEPHONE (OUTPATIENT)
Age: 35
End: 2025-02-18

## 2025-02-18 NOTE — TELEPHONE ENCOUNTER
LVM advising patient MyChart message not addressed regarding her iPledge consent form. Patient needs to complete consent form prior to starting isotretinoin.

## 2025-03-06 ENCOUNTER — TELEPHONE (OUTPATIENT)
Age: 35
End: 2025-03-06

## 2025-03-06 DIAGNOSIS — L70.0 ACNE VULGARIS: ICD-10-CM

## 2025-03-06 RX ORDER — SPIRONOLACTONE 100 MG/1
TABLET, FILM COATED ORAL
Qty: 30 TABLET | Refills: 11 | Status: SHIPPED | OUTPATIENT
Start: 2025-03-06

## 2025-03-06 NOTE — TELEPHONE ENCOUNTER
Patient called and stated that she is going to have laser treatments and does not want to start Isotretinoin as prescribed on the VV 1/31/25. Patient is requesting refills for Spironolactone. She stated she is currently not having a acne break out. She is hoping with the Spironolactone and laser she will not need to take the Isotretinoin. Please advise.

## 2025-05-03 ENCOUNTER — OFFICE VISIT (OUTPATIENT)
Age: 35
End: 2025-05-03

## 2025-05-03 VITALS
TEMPERATURE: 98 F | HEIGHT: 65 IN | OXYGEN SATURATION: 98 % | RESPIRATION RATE: 18 BRPM | DIASTOLIC BLOOD PRESSURE: 85 MMHG | HEART RATE: 73 BPM | SYSTOLIC BLOOD PRESSURE: 128 MMHG | BODY MASS INDEX: 28.32 KG/M2 | WEIGHT: 170 LBS

## 2025-05-03 DIAGNOSIS — M54.50 ACUTE BILATERAL LOW BACK PAIN WITHOUT SCIATICA: ICD-10-CM

## 2025-05-03 DIAGNOSIS — S33.5XXA LUMBAR SPRAIN, INITIAL ENCOUNTER: Primary | ICD-10-CM

## 2025-05-03 RX ORDER — DEXAMETHASONE SODIUM PHOSPHATE 10 MG/ML
10 INJECTION, SOLUTION INTRA-ARTICULAR; INTRALESIONAL; INTRAMUSCULAR; INTRAVENOUS; SOFT TISSUE ONCE
Status: COMPLETED | OUTPATIENT
Start: 2025-05-03 | End: 2025-05-03

## 2025-05-03 RX ORDER — DEXAMETHASONE SODIUM PHOSPHATE 10 MG/ML
10 INJECTION, SOLUTION INTRAMUSCULAR; INTRAVENOUS ONCE
Status: DISCONTINUED | OUTPATIENT
Start: 2025-05-03 | End: 2025-05-03

## 2025-05-03 RX ADMIN — DEXAMETHASONE SODIUM PHOSPHATE 10 MG: 10 INJECTION, SOLUTION INTRA-ARTICULAR; INTRALESIONAL; INTRAMUSCULAR; INTRAVENOUS; SOFT TISSUE at 14:22

## 2025-05-03 ASSESSMENT — ENCOUNTER SYMPTOMS
VOMITING: 0
CONSTIPATION: 0
COUGH: 0
NAUSEA: 0
ABDOMINAL PAIN: 0
BACK PAIN: 1
SHORTNESS OF BREATH: 0
DIARRHEA: 0

## 2025-05-03 NOTE — PROGRESS NOTES
[facility] URGENT CARE, [facility] ([facility])  [facility] URGENT CARE [place] [address]  Dept: [phone]    Date: 5/3/25    Natalie Umanzor (:  1990) is a 34 y.o. female, here for evaluation of the following chief complaint(s):  Back Pain (X5 days)      HPI  34 y.o. female presents with lower back pain similar to past episodes.      Back Pain  Pertinent negatives include no abdominal pain, chest pain, headaches or weakness.        ROS  Review of Systems   Constitutional:  Negative for chills, diaphoresis and fatigue.   Respiratory:  Negative for cough and shortness of breath.    Cardiovascular:  Negative for chest pain and palpitations.   Gastrointestinal:  Negative for abdominal pain, constipation, diarrhea, nausea and vomiting.   Musculoskeletal:  Positive for back pain. Negative for gait problem, neck pain and neck stiffness.   Skin:  Negative for pallor, rash and wound.   Neurological:  Negative for dizziness, weakness, light-headedness and headaches.       PHYSICAL EXAM  Vitals:    25 1339   BP: 128/85   BP Site: Left Upper Arm   Patient Position: Sitting   BP Cuff Size: Medium Adult   Pulse: 73   Resp: 18   Temp: 98 °F (36.7 °C)   TempSrc: Oral   SpO2: 98%   Weight: 77.1 kg (170 lb)   Height: 1.651 m (5' 5\")     Physical Exam  Constitutional:       General: She is in acute distress.   Eyes:      Conjunctiva/sclera: Conjunctivae normal.   Cardiovascular:      Rate and Rhythm: Normal rate.      Pulses: Normal pulses.      Heart sounds: Normal heart sounds.   Pulmonary:      Effort: Pulmonary effort is normal.      Breath sounds: Normal breath sounds.   Abdominal:      General: Abdomen is flat. There is no distension.      Palpations: Abdomen is soft.      Tenderness: There is no abdominal tenderness.   Musculoskeletal:         General: Tenderness present. No swelling, deformity or signs of injury. Normal range of motion.      Cervical back: No rigidity or

## 2025-06-05 ENCOUNTER — OFFICE VISIT (OUTPATIENT)
Age: 35
End: 2025-06-05

## 2025-06-05 VITALS
BODY MASS INDEX: 26.03 KG/M2 | OXYGEN SATURATION: 97 % | WEIGHT: 162 LBS | HEART RATE: 69 BPM | HEIGHT: 66 IN | TEMPERATURE: 97.5 F

## 2025-06-05 DIAGNOSIS — L70.0 ACNE VULGARIS: Primary | ICD-10-CM

## 2025-06-05 RX ORDER — DOXYCYCLINE 100 MG/1
CAPSULE ORAL
COMMUNITY
Start: 2025-05-27

## 2025-06-05 NOTE — PROGRESS NOTES
Dermatology Patient Note  [facility]  [facility] DERMATOLOGY  [address]  [place] [address]  Dept: [phone]  Dept Fax: [phone]      VISITDATE: 6/5/2025   REFERRING PROVIDER: No ref. provider found      Natalie Umanzor is a 34 y.o. female  who presents today in the office for:    Other (Patient presents today for a injection of an acne lesion left lateral cheek, present x 2 weeks.  Patient had a facial May20th.  Patient feels it is skin purging from that treatment.  )      HISTORY OF PRESENT ILLNESS:  As above. Patient presents for a follow up for ILK for acne lesion. At the last telemedicine visit, 1/31/25, she continued repeat isotretinoin course, tretinoin cream, and spironolactone 100 mg.    Today, patient reports she recently had a HALO treatment and was advised purging would be an expected SE. Tried spot treatment and cold compress without benefit. She has not experienced a severe flare like this since before starting Accutane.     MEDICAL PROBLEMS:  Patient Active Problem List    Diagnosis Date Noted    Attention and concentration deficit 09/05/2023    Chronic idiopathic constipation 09/05/2023    Heartburn 09/05/2023    Mood disorder 07/28/2023    Low back pain, unspecified 05/15/2023    Generalized anxiety disorder 05/15/2023    LGSIL of cervix of undetermined significance     Asthma     Depression     Attention deficit disorder (ADD)        CURRENT MEDICATIONS:   Current Outpatient Medications   Medication Sig Dispense Refill    doxycycline hyclate (VIBRAMYCIN) 100 MG capsule       spironolactone (ALDACTONE) 100 MG tablet Take one tablet PO daily 30 tablet 11    tretinoin (RETIN-A) 0.025 % cream Apply pea sized amount to face nightly 45 g 11    metroNIDAZOLE (METROGEL) 0.75 % gel Apply topically 2 times daily. 45 g 0    buPROPion (WELLBUTRIN XL) 150 MG extended release tablet       cyclobenzaprine (FLEXERIL) 5 MG tablet       omeprazole (PRILOSEC) 20

## 2025-07-29 RX ORDER — TRAMADOL HYDROCHLORIDE 50 MG/1
TABLET ORAL
COMMUNITY
Start: 2025-05-08

## 2025-07-30 ENCOUNTER — OFFICE VISIT (OUTPATIENT)
Dept: OBGYN CLINIC | Age: 35
End: 2025-07-30
Payer: COMMERCIAL

## 2025-07-30 ENCOUNTER — HOSPITAL ENCOUNTER (OUTPATIENT)
Age: 35
Setting detail: SPECIMEN
Discharge: HOME | End: 2025-07-30

## 2025-07-30 VITALS
SYSTOLIC BLOOD PRESSURE: 112 MMHG | HEIGHT: 65 IN | BODY MASS INDEX: 26.99 KG/M2 | DIASTOLIC BLOOD PRESSURE: 82 MMHG | WEIGHT: 162 LBS

## 2025-07-30 DIAGNOSIS — N94.6 DYSMENORRHEA: ICD-10-CM

## 2025-07-30 DIAGNOSIS — Z11.3 SCREEN FOR STD (SEXUALLY TRANSMITTED DISEASE): ICD-10-CM

## 2025-07-30 DIAGNOSIS — Z11.51 SPECIAL SCREENING EXAMINATION FOR HUMAN PAPILLOMAVIRUS (HPV): ICD-10-CM

## 2025-07-30 DIAGNOSIS — Z01.419 WELL FEMALE EXAM WITH ROUTINE GYNECOLOGICAL EXAM: Primary | ICD-10-CM

## 2025-07-30 DIAGNOSIS — N64.4 BREAST TENDERNESS: ICD-10-CM

## 2025-07-30 PROCEDURE — 99395 PREV VISIT EST AGE 18-39: CPT | Performed by: NURSE PRACTITIONER

## 2025-07-30 PROCEDURE — 99213 OFFICE O/P EST LOW 20 MIN: CPT | Performed by: NURSE PRACTITIONER

## 2025-07-30 RX ORDER — NORETHINDRONE ACETATE AND ETHINYL ESTRADIOL 1MG-20(21)
1 KIT ORAL DAILY
Qty: 28 TABLET | Refills: 8 | Status: SHIPPED | OUTPATIENT
Start: 2025-07-30

## 2025-07-30 RX ORDER — LIDOCAINE 50 MG/G
PATCH TOPICAL
COMMUNITY
Start: 2025-07-28

## 2025-07-30 NOTE — PROGRESS NOTES
recommendations discussed.  STD counseling and prevention reviewed.  Gardisil counseling completed for all patients 9-46 yo.  Routine health maintenance per patients PCP.  Orders Placed This Encounter   Procedures    C.trachomatis N.gonorrhoeae DNA     Standing Status:   Future     Expected Date:   7/30/2025     Expiration Date:   7/30/2026    Vaginitis DNA Probe     Standing Status:   Future     Expected Date:   7/30/2025     Expiration Date:   7/30/2026    GADIEL ZUHAIR DIGITAL DIAGNOSTIC BILATERAL     Standing Status:   Future     Expected Date:   7/30/2025     Expiration Date:   9/30/2026    US BREAST LIMITED LEFT     Standing Status:   Future     Expected Date:   7/30/2025     Expiration Date:   7/30/2026    PAP SMEAR     Patient History:    No LMP recorded.  OBGYN Status: Having periods  No past surgical history on file.      Social History    Tobacco Use      Smoking status: Never      Smokeless tobacco: Never       Standing Status:   Future     Expected Date:   7/30/2025     Expiration Date:   7/29/2026     Collection Type:   Thin Prep     Prior Abnormal Pap Test:   Yes              LSIL/HPV 16/HPV other     If Prior Abnormal, Give Date:   11/6/23     Prior Treatment:   Other              colp 4/5/24 neg     Screening or Diagnostic:   Screening     HPV Requested?:   Yes     High Risk Patient:   N/A           The patient, Natalie Umanzor is a 35 y.o. female, was seen with a total time spent of 20 minutes excluding preventing care   minutes for the visit on this date of service by the E/M provider. The time component had both face to face and non face to face time spent in determining the total time component.  Counseling and education regarding her diagnosis listed below and her options regarding those diagnoses were also included in determining her time component.      Diagnosis Orders   1. Well female exam with routine gynecological exam  PAP SMEAR      2. Special screening examination for human

## 2025-07-31 ENCOUNTER — RESULTS FOLLOW-UP (OUTPATIENT)
Dept: OBGYN CLINIC | Age: 35
End: 2025-07-31

## 2025-07-31 DIAGNOSIS — Z11.3 SCREEN FOR STD (SEXUALLY TRANSMITTED DISEASE): ICD-10-CM

## 2025-07-31 LAB
C TRACH DNA SPEC QL PROBE+SIG AMP: NEGATIVE
CANDIDA SPECIES: NEGATIVE
GARDNERELLA VAGINALIS: POSITIVE
HPV I/H RISK 4 DNA CVX QL NAA+PROBE: DETECTED
HPV SAMPLE: ABNORMAL
HPV, INTERPRETATION: ABNORMAL
HPV16 DNA CVX QL NAA+PROBE: DETECTED
HPV18 DNA CVX QL NAA+PROBE: NOT DETECTED
N GONORRHOEA DNA SPEC QL PROBE+SIG AMP: NEGATIVE
SOURCE: ABNORMAL
SPECIMEN DESCRIPTION: ABNORMAL
SPECIMEN DESCRIPTION: NORMAL
TRICHOMONAS: NEGATIVE

## 2025-07-31 RX ORDER — METRONIDAZOLE 500 MG/1
500 TABLET ORAL 2 TIMES DAILY
Qty: 14 TABLET | Refills: 0 | Status: SHIPPED | OUTPATIENT
Start: 2025-07-31 | End: 2025-08-07

## 2025-07-31 NOTE — TELEPHONE ENCOUNTER
----- Message from SHONDA Tam NP sent at 7/31/2025 10:22 AM EDT -----  + BV  Flagyl 500mg PO BID x 7 days

## 2025-07-31 NOTE — TELEPHONE ENCOUNTER
[facility] Result Note Patient Contact Communication   [address] [place] [address]      07/31/25   12:09 PM     Patients Name: Natalie Umanzor   Medical Record Number: [id number]   Contact Serial Number: [id number]  YOB: 1990       Patients Phone Number: [phone]     Description of Recommendations:  The patient was contacted and her identity was confirmed with two of the specific identifiers listed above.  The information regarding her recent testing results and provider recommendations were reviewed with her in detail and all questions were answered.   Recent labs/Cultures/ Imaging Studies/Pathology reports and Urinalysis results are included:      Recommendations given by provider:  + BV  Flagyl 500mg PO BID x 7 days        The patient verbalized understanding of the information above and the recommendation by the provider. She will contact her PCP if any other questions arise or contact our office for any other clarifications.        Electronically signed by Ruthie Hollingsworth MA on 7/31/2025 at 12:09 PM

## 2025-08-19 LAB — CYTOLOGY REPORT: NORMAL
